# Patient Record
Sex: FEMALE | Race: WHITE | NOT HISPANIC OR LATINO | URBAN - METROPOLITAN AREA
[De-identification: names, ages, dates, MRNs, and addresses within clinical notes are randomized per-mention and may not be internally consistent; named-entity substitution may affect disease eponyms.]

---

## 2020-01-07 ENCOUNTER — EMERGENCY (EMERGENCY)
Facility: HOSPITAL | Age: 30
LOS: 1 days | Discharge: HOME | End: 2020-01-07
Attending: EMERGENCY MEDICINE | Admitting: EMERGENCY MEDICINE
Payer: COMMERCIAL

## 2020-01-07 VITALS
DIASTOLIC BLOOD PRESSURE: 62 MMHG | OXYGEN SATURATION: 100 % | SYSTOLIC BLOOD PRESSURE: 96 MMHG | TEMPERATURE: 99 F | HEART RATE: 70 BPM | RESPIRATION RATE: 18 BRPM

## 2020-01-07 PROCEDURE — 99282 EMERGENCY DEPT VISIT SF MDM: CPT

## 2020-01-07 NOTE — ED PROVIDER NOTE - NSFOLLOWUPINSTRUCTIONS_ED_ALL_ED_FT
Please take ibuprofen 400mg every 6 hours as needed for pain with food.  Please take tylenol 650mg every 4 hours as needed for pain.  Please follow up with your primary doctor in the next few days.    Cervical Sprain (Neck Sprain)    A cervical sprain is when the connective tissues (muscles/ligaments) that hold the neck bones in place stretch or tear. You may apply heating or cooling pads (or ice) to help with the pain. Avoid positions and activities that make your problems worse.    SEEK IMMEDIATE MEDICAL CARE IF YOU HAVE ANY OF THE FOLLOWING SYMPTOMS: weakness, or numbness of any part of the body, headache, dizziness or lightheadedness, or loss of control of bowel/bladder
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
## 2020-01-07 NOTE — ED PROVIDER NOTE - NS ED ROS FT
ROS:  GENERAL: No fever, no chills  EYES: no change in vision  HEENT: no trouble swallowing, no trouble speaking  CARDIAC: no chest pain  PULMONARY: no cough, no shortness of breath  GI: no abdominal pain, no vomiting, no diarrhea, no constipation  : No dysuria, no frequency, no change in appearance, or odor of urine  SKIN: no rashes  NEURO: no headache, no weakness, no numbness  MSK: +neck/back pain
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
## 2020-01-07 NOTE — ED PROVIDER NOTE - PATIENT PORTAL LINK FT
You can access the FollowMyHealth Patient Portal offered by [facility] by registering at the following website: [email or web address]. By joining [facility]’s FollowMyHealth portal, you will also be able to view your health information using other applications (apps) compatible with our system.
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
## 2020-05-13 ENCOUNTER — INPATIENT (INPATIENT)
Facility: HOSPITAL | Age: 30
LOS: 1 days | Discharge: HOME | End: 2020-05-15
Attending: INTERNAL MEDICINE | Admitting: INTERNAL MEDICINE
Payer: COMMERCIAL

## 2020-05-13 VITALS
SYSTOLIC BLOOD PRESSURE: 109 MMHG | RESPIRATION RATE: 17 BRPM | HEART RATE: 93 BPM | DIASTOLIC BLOOD PRESSURE: 92 MMHG | OXYGEN SATURATION: 100 % | TEMPERATURE: 99 F

## 2020-05-13 LAB
ALBUMIN SERPL ELPH-MCNC: 5.2 G/DL — HIGH (ref 3.3–5)
ALP SERPL-CCNC: 41 U/L — SIGNIFICANT CHANGE UP (ref 40–120)
ALT FLD-CCNC: 13 U/L — SIGNIFICANT CHANGE UP (ref 4–33)
ANION GAP SERPL CALC-SCNC: 16 MMO/L — HIGH (ref 7–14)
APTT BLD: 27.6 SEC — SIGNIFICANT CHANGE UP (ref 27.5–36.3)
AST SERPL-CCNC: 19 U/L — SIGNIFICANT CHANGE UP (ref 4–32)
BASOPHILS # BLD AUTO: 0.02 K/UL — SIGNIFICANT CHANGE UP (ref 0–0.2)
BASOPHILS NFR BLD AUTO: 0.2 % — SIGNIFICANT CHANGE UP (ref 0–2)
BILIRUB SERPL-MCNC: 0.6 MG/DL — SIGNIFICANT CHANGE UP (ref 0.2–1.2)
BLD GP AB SCN SERPL QL: NEGATIVE — SIGNIFICANT CHANGE UP
BUN SERPL-MCNC: 5 MG/DL — LOW (ref 7–23)
CALCIUM SERPL-MCNC: 10 MG/DL — SIGNIFICANT CHANGE UP (ref 8.4–10.5)
CHLORIDE SERPL-SCNC: 102 MMOL/L — SIGNIFICANT CHANGE UP (ref 98–107)
CO2 SERPL-SCNC: 23 MMOL/L — SIGNIFICANT CHANGE UP (ref 22–31)
CREAT SERPL-MCNC: 0.73 MG/DL — SIGNIFICANT CHANGE UP (ref 0.5–1.3)
EOSINOPHIL # BLD AUTO: 0.1 K/UL — SIGNIFICANT CHANGE UP (ref 0–0.5)
EOSINOPHIL NFR BLD AUTO: 1.1 % — SIGNIFICANT CHANGE UP (ref 0–6)
GLUCOSE SERPL-MCNC: 103 MG/DL — HIGH (ref 70–99)
HCG SERPL-ACNC: < 5 MIU/ML — SIGNIFICANT CHANGE UP
HCT VFR BLD CALC: 41.2 % — SIGNIFICANT CHANGE UP (ref 34.5–45)
HGB BLD-MCNC: 14.1 G/DL — SIGNIFICANT CHANGE UP (ref 11.5–15.5)
IMM GRANULOCYTES NFR BLD AUTO: 0.1 % — SIGNIFICANT CHANGE UP (ref 0–1.5)
INR BLD: 1.09 — SIGNIFICANT CHANGE UP (ref 0.88–1.17)
LIDOCAIN IGE QN: 22.8 U/L — SIGNIFICANT CHANGE UP (ref 7–60)
LYMPHOCYTES # BLD AUTO: 3.63 K/UL — HIGH (ref 1–3.3)
LYMPHOCYTES # BLD AUTO: 41.4 % — SIGNIFICANT CHANGE UP (ref 13–44)
MCHC RBC-ENTMCNC: 31.1 PG — SIGNIFICANT CHANGE UP (ref 27–34)
MCHC RBC-ENTMCNC: 34.2 % — SIGNIFICANT CHANGE UP (ref 32–36)
MCV RBC AUTO: 90.7 FL — SIGNIFICANT CHANGE UP (ref 80–100)
MONOCYTES # BLD AUTO: 0.59 K/UL — SIGNIFICANT CHANGE UP (ref 0–0.9)
MONOCYTES NFR BLD AUTO: 6.7 % — SIGNIFICANT CHANGE UP (ref 2–14)
NEUTROPHILS # BLD AUTO: 4.41 K/UL — SIGNIFICANT CHANGE UP (ref 1.8–7.4)
NEUTROPHILS NFR BLD AUTO: 50.5 % — SIGNIFICANT CHANGE UP (ref 43–77)
NRBC # FLD: 0 K/UL — SIGNIFICANT CHANGE UP (ref 0–0)
NT-PROBNP SERPL-SCNC: 37.25 PG/ML — SIGNIFICANT CHANGE UP
PLATELET # BLD AUTO: 313 K/UL — SIGNIFICANT CHANGE UP (ref 150–400)
PMV BLD: 10.7 FL — SIGNIFICANT CHANGE UP (ref 7–13)
POTASSIUM SERPL-MCNC: 3.5 MMOL/L — SIGNIFICANT CHANGE UP (ref 3.5–5.3)
POTASSIUM SERPL-SCNC: 3.5 MMOL/L — SIGNIFICANT CHANGE UP (ref 3.5–5.3)
PROT SERPL-MCNC: 8 G/DL — SIGNIFICANT CHANGE UP (ref 6–8.3)
PROTHROM AB SERPL-ACNC: 12.6 SEC — SIGNIFICANT CHANGE UP (ref 9.8–13.1)
RBC # BLD: 4.54 M/UL — SIGNIFICANT CHANGE UP (ref 3.8–5.2)
RBC # FLD: 11.7 % — SIGNIFICANT CHANGE UP (ref 10.3–14.5)
RH IG SCN BLD-IMP: POSITIVE — SIGNIFICANT CHANGE UP
SARS-COV-2 RNA SPEC QL NAA+PROBE: SIGNIFICANT CHANGE UP
SODIUM SERPL-SCNC: 141 MMOL/L — SIGNIFICANT CHANGE UP (ref 135–145)
TROPONIN T, HIGH SENSITIVITY: < 6 NG/L — SIGNIFICANT CHANGE UP (ref ?–14)
WBC # BLD: 8.76 K/UL — SIGNIFICANT CHANGE UP (ref 3.8–10.5)
WBC # FLD AUTO: 8.76 K/UL — SIGNIFICANT CHANGE UP (ref 3.8–10.5)

## 2020-05-13 PROCEDURE — 71045 X-RAY EXAM CHEST 1 VIEW: CPT | Mod: 26

## 2020-05-13 NOTE — ED ADULT TRIAGE NOTE - CHIEF COMPLAINT QUOTE
Patient c/o redness to chest, shortness of breath starting Saturday. Patient reports she was diagnosed with pericarditis last week. Patient states she last took 600mg ibuprofen around 7pm with minimal relief. Denies any other past medical history.

## 2020-05-13 NOTE — ED PROVIDER NOTE - PHYSICAL EXAMINATION
General: anxious-appearing young woman in no acute distress  Head: normocephalic, atraumatic  Eyes: clear eyes  Mouth: moist mucous membranes  Neck: supple neck  CV: normal rate and rhythm, normal s1, s2, no LE edema or tenderness to palpation, peripheral pulses 2+ bilaterally  Respiratory: clear to auscultation bilaterally  Abdomen: soft, nondistended, mild tenderness in epigastric region  : no suprapubic tenderness  Neuro: alert and oriented x3, speech clear, strength 5/5 UE and LE bilaterally  Skin: no rash on chest  Extremities: no swelling noted of either knee, no warmth or tenderness to palpation, no overlying erythema

## 2020-05-13 NOTE — ED CDU PROVIDER INITIAL DAY NOTE - DETAILS
Patient is a 29 y.o female with no known PMHx who presents to ED c/o 1.5 weeks of intermittent chest pain with sob. Previously seen at [facility] and was dx with pericarditis. Pt seen and worked up in ED, ECG showing ST Elevations I, II, III, AVF V4-V6 with TWI V1V2, possible early repol vs pericarditis. Negative trop/bnp. CXR neg. Covid neg. Cards consulted. Recommend Echo in am. Pt transferred to CDU for; echo, pain control, tele, and cards.

## 2020-05-13 NOTE — ED CDU PROVIDER INITIAL DAY NOTE - ATTENDING CONTRIBUTION TO CARE
Karen: 28yo female with no significant medical history c/o one week of subjective fevers and chills with some chest pain and SOB. Pt seen at an C last week told that she had pericarditis and was todl to take ibuprofen. However since then the chest pain and SOB has continued to worsen prompting her visit to the ED today. No associated LE pain or edema. No abdominal pain, nausea or vomiting. Exam: GENERAL: well appearing, NAD, HEENT: MMM, PERRLA, CARDIO: +S1/S2, no murmurs, rubs or gallops, LUNGS: CTA B/L, no wheezing, rales or rhonchi, ABD: soft, nontender, BSx4 quadrants, no guarding or rigidity. EXT: No LE edema or calf TTP, 2+ distal pulses x 4 extremities. NEURO: AxOx3,SKIN: no rashes or lesions, well perfused A/P- 28yo female with likely COVID and pericarditis. discussed with tele doc on call plan for CDU, echo, tele monitoring and cards evaluation in the morning

## 2020-05-13 NOTE — ED PROVIDER NOTE - ATTENDING CONTRIBUTION TO CARE
Karen: 28yo female with no significant medical history c/o one week of subjective fevers and chills with some chest pain and SOB. Pt seen at an [facility] last week told that she had pericarditis and was todl to take ibuprofen. However since then the chest pain and SOB has continued to worsen prompting her visit to the ED today. No associated LE pain or edema. No abdominal pain, nausea or vomiting. Exam: GENERAL: well appearing, NAD, HEENT: MMM, PERRLA, CARDIO: +S1/S2, no murmurs, rubs or gallops, LUNGS: CTA B/L, no wheezing, rales or rhonchi, ABD: soft, nontender, BSx4 quadrants, no guarding or rigidity. EXT: No LE edema or calf TTP, 2+ distal pulses x 4 extremities. NEURO: AxOx3,SKIN: no rashes or lesions, well perfused A/P- 28yo female with likely COVID with STEMI on EKG, STEMI called and discussed with interventionalist, no indication for cath at this time likely pericarditis. will obtain labs, CXR and reassess.

## 2020-05-13 NOTE — ED ADULT NURSE NOTE - OBJECTIVE STATEMENT
Patient is a 29y female, A&Ox4, ambulatory, no significant medical history, presenting to the emergency department for subjective fevers and chest pain. Patient was seen at an urgent care last week and was diagnosed with pericarditis. The pain is intermittent, midsternal pressure, radiating around left breast to the back. Today the patient felt anxious and warm along with the chest pain and came to the emergency department for eval. IV initiated 18 gauge right antecubital. Labs drawn and sent. Placed on cardiac monitor, sinus rhythm. As per MD, EKG has abnormality. Patient also placed on zoll. Stretcher in lowest position, wheels locked, side rails up as per protocol. Call bell in reach. Will continue to monitor.

## 2020-05-13 NOTE — ED CDU PROVIDER INITIAL DAY NOTE - OBJECTIVE STATEMENT
HPI: Patient is a 29 y.o female with no known PMHx who presents to ED c/o 1.5 weeks of intermittent chest pain with sob. Patient states that about a month ago began to have b/l knee pain and swelling, then notes 1.5 week of intermittent chest pain mostly left sided that is non-exertional, mildly pleuritic and exacerbated with touch. Sx a/w some sob. Admits she has hx of anxiety and was experiencing more frequent panic attacks as well. Pt notes some viral URI sx as well with subjective fevers, nasal congestion and post-nasal drip. [place] [facility] [place] was dx with pericarditis, advised to take IBU. Symptoms persisted so patient came to ED. Pt seen and worked up in ED, ECG showing ST Elevations I, II, III, AVF V4-V6 with TWI V1V2, possible early repol vs pericarditis.  Pt was code STEMI, ED team spoke to interventionalist at this time who reviewed ECG and feels sx likely pericarditis. Negative trop/bnp. CXR neg. Covid neg. Cards consulted. Recommend Echo in am. Pt transferred to CDU for; echo, pain control, tele, and cards. Pt denies OCP use, v/d/abdominal pain, neck pain, trauma/falls, calf pain. Pt +smoker.

## 2020-05-13 NOTE — ED PROVIDER NOTE - NS ED ROS FT
General: no fever  Head: no headache  Eyes: no eye pain   ENT: no nasal discharge/congestion, no sore throat  CV: +chest pain  Resp: +SOB, no cough  GI: +nausea, no V/D  : no dysuria  MSK: no new joint pain  Skin: no rash  Neuro: no focal weakness

## 2020-05-13 NOTE — ED PROVIDER NOTE - OBJECTIVE STATEMENT
28yo woman no PMH presents with SOB x 1 week with associated left upper back pain radiating to the front and nausea. She was seen at urgent care 2 days ago and was diagnosed with pericarditis and told to take ibuprofen which had initially helped but then symptoms worsened again and she came to ED for evaluation. She states that she has had decreased PO intake with nausea in the past week but was able to tolerate PO. She denies fever, cough, sore throat, runny nose, vomiting or diarrhea. She states she noticed a redness on her left chest when symptoms started but denies any current rash at this time. She also notes she has generalized weakness but no focal weakness in arms or legs. She also notes that she has swelling in bilateral knees but no overlying skin changes. No trauma, no strenuous activity. She has an uncle with heart disease at <50 years old. Family hx of autoimmune disorders.

## 2020-05-13 NOTE — ED CDU PROVIDER INITIAL DAY NOTE - PHYSICAL EXAMINATION
Vital signs reviewed.   CONSTITUTIONAL: Well-appearing; well-nourished; in no apparent distress. Non-toxic appearing.   HEAD: Normocephalic, atraumatic.  EYES: PERRL, EOM intact, conjunctiva and sclera WNL.  ENT: normal nose; no rhinorrhea;   NECK/LYMPH: Supple; non-tender;   CARD: Normal S1, S2; no murmurs, rubs, or gallops noted.  RESP: Normal chest excursion with respiration; breath sounds clear and equal bilaterally; no wheezes, rhonchi, or rales.  ABD/GI: soft, non-distended;   EXT/MS: moves all extremities; distal pulses are normal, no pedal edema.  SKIN: Normal for age and race; warm; dry; good turgor; no apparent lesions or exudate noted.  NEURO: Awake, alert, oriented x 3, no gross deficits  PSYCH: Normal mood; appropriate affect.
Attending Only

## 2020-05-14 DIAGNOSIS — U07.0 VAPING-RELATED DISORDER: ICD-10-CM

## 2020-05-14 DIAGNOSIS — Z29.9 ENCOUNTER FOR PROPHYLACTIC MEASURES, UNSPECIFIED: ICD-10-CM

## 2020-05-14 DIAGNOSIS — I30.9 ACUTE PERICARDITIS, UNSPECIFIED: ICD-10-CM

## 2020-05-14 DIAGNOSIS — F12.10 CANNABIS ABUSE, UNCOMPLICATED: ICD-10-CM

## 2020-05-14 DIAGNOSIS — I31.9 DISEASE OF PERICARDIUM, UNSPECIFIED: ICD-10-CM

## 2020-05-14 LAB
B PERT DNA SPEC QL NAA+PROBE: NOT DETECTED — SIGNIFICANT CHANGE UP
C PNEUM DNA SPEC QL NAA+PROBE: NOT DETECTED — SIGNIFICANT CHANGE UP
CRP SERPL-MCNC: < 4 MG/L — SIGNIFICANT CHANGE UP
D DIMER BLD IA.RAPID-MCNC: < 150 NG/ML — SIGNIFICANT CHANGE UP
ERYTHROCYTE [SEDIMENTATION RATE] IN BLOOD: 4 MM/HR — SIGNIFICANT CHANGE UP (ref 4–25)
FLUAV H1 2009 PAND RNA SPEC QL NAA+PROBE: NOT DETECTED — SIGNIFICANT CHANGE UP
FLUAV H1 RNA SPEC QL NAA+PROBE: NOT DETECTED — SIGNIFICANT CHANGE UP
FLUAV H3 RNA SPEC QL NAA+PROBE: NOT DETECTED — SIGNIFICANT CHANGE UP
FLUAV SUBTYP SPEC NAA+PROBE: NOT DETECTED — SIGNIFICANT CHANGE UP
FLUBV RNA SPEC QL NAA+PROBE: NOT DETECTED — SIGNIFICANT CHANGE UP
HADV DNA SPEC QL NAA+PROBE: NOT DETECTED — SIGNIFICANT CHANGE UP
HCOV PNL SPEC NAA+PROBE: SIGNIFICANT CHANGE UP
HMPV RNA SPEC QL NAA+PROBE: NOT DETECTED — SIGNIFICANT CHANGE UP
HPIV1 RNA SPEC QL NAA+PROBE: NOT DETECTED — SIGNIFICANT CHANGE UP
HPIV2 RNA SPEC QL NAA+PROBE: NOT DETECTED — SIGNIFICANT CHANGE UP
HPIV3 RNA SPEC QL NAA+PROBE: NOT DETECTED — SIGNIFICANT CHANGE UP
HPIV4 RNA SPEC QL NAA+PROBE: NOT DETECTED — SIGNIFICANT CHANGE UP
RSV RNA SPEC QL NAA+PROBE: NOT DETECTED — SIGNIFICANT CHANGE UP
RV+EV RNA SPEC QL NAA+PROBE: NOT DETECTED — SIGNIFICANT CHANGE UP
TROPONIN T, HIGH SENSITIVITY: < 6 NG/L — SIGNIFICANT CHANGE UP (ref ?–14)

## 2020-05-14 PROCEDURE — 93306 TTE W/DOPPLER COMPLETE: CPT | Mod: 26

## 2020-05-14 RX ORDER — SODIUM CHLORIDE 9 MG/ML
1000 INJECTION INTRAMUSCULAR; INTRAVENOUS; SUBCUTANEOUS ONCE
Refills: 0 | Status: COMPLETED | OUTPATIENT
Start: 2020-05-14 | End: 2020-05-14

## 2020-05-14 RX ORDER — PANTOPRAZOLE SODIUM 20 MG/1
40 TABLET, DELAYED RELEASE ORAL
Refills: 0 | Status: DISCONTINUED | OUTPATIENT
Start: 2020-05-14 | End: 2020-05-15

## 2020-05-14 RX ORDER — SODIUM CHLORIDE 9 MG/ML
1000 INJECTION INTRAMUSCULAR; INTRAVENOUS; SUBCUTANEOUS
Refills: 0 | Status: DISCONTINUED | OUTPATIENT
Start: 2020-05-14 | End: 2020-05-15

## 2020-05-14 RX ORDER — COLCHICINE 0.6 MG
0.6 TABLET ORAL ONCE
Refills: 0 | Status: COMPLETED | OUTPATIENT
Start: 2020-05-14 | End: 2020-05-14

## 2020-05-14 RX ORDER — KETOROLAC TROMETHAMINE 30 MG/ML
30 SYRINGE (ML) INJECTION ONCE
Refills: 0 | Status: DISCONTINUED | OUTPATIENT
Start: 2020-05-14 | End: 2020-05-14

## 2020-05-14 RX ORDER — ONDANSETRON 8 MG/1
4 TABLET, FILM COATED ORAL ONCE
Refills: 0 | Status: COMPLETED | OUTPATIENT
Start: 2020-05-14 | End: 2020-05-14

## 2020-05-14 RX ORDER — COLCHICINE 0.6 MG
0.6 TABLET ORAL
Refills: 0 | Status: DISCONTINUED | OUTPATIENT
Start: 2020-05-14 | End: 2020-05-15

## 2020-05-14 RX ORDER — IBUPROFEN 200 MG
600 TABLET ORAL EVERY 6 HOURS
Refills: 0 | Status: DISCONTINUED | OUTPATIENT
Start: 2020-05-14 | End: 2020-05-15

## 2020-05-14 RX ADMIN — Medication 30 MILLIGRAM(S): at 06:53

## 2020-05-14 RX ADMIN — SODIUM CHLORIDE 1000 MILLILITER(S): 9 INJECTION INTRAMUSCULAR; INTRAVENOUS; SUBCUTANEOUS at 00:52

## 2020-05-14 RX ADMIN — SODIUM CHLORIDE 100 MILLILITER(S): 9 INJECTION INTRAMUSCULAR; INTRAVENOUS; SUBCUTANEOUS at 18:12

## 2020-05-14 RX ADMIN — Medication 600 MILLIGRAM(S): at 20:38

## 2020-05-14 RX ADMIN — ONDANSETRON 4 MILLIGRAM(S): 8 TABLET, FILM COATED ORAL at 20:20

## 2020-05-14 RX ADMIN — Medication 30 MILLIGRAM(S): at 00:23

## 2020-05-14 RX ADMIN — SODIUM CHLORIDE 100 MILLILITER(S): 9 INJECTION INTRAMUSCULAR; INTRAVENOUS; SUBCUTANEOUS at 16:18

## 2020-05-14 RX ADMIN — SODIUM CHLORIDE 100 MILLILITER(S): 9 INJECTION INTRAMUSCULAR; INTRAVENOUS; SUBCUTANEOUS at 06:52

## 2020-05-14 RX ADMIN — Medication 0.6 MILLIGRAM(S): at 16:18

## 2020-05-14 NOTE — H&P ADULT - ASSESSMENT
29y Female with NO PMH, ?recent viral illness, COVID negative x 1, presenting with abnormal EKG and chest pain, probably secondary to pericarditis.

## 2020-05-14 NOTE — H&P ADULT - RS GEN PE MLT RESP DETAILS PC
airway patent/breath sounds equal/respirations non-labored/no rhonchi/no wheezes/no chest wall tenderness/good air movement/clear to auscultation bilaterally/no rales

## 2020-05-14 NOTE — H&P ADULT - ATTENDING COMMENTS
Patient seen and examined.  Agree with above.   Admitted with chest pain anterior wall ECG changes  MI ruled out  Suspect symptoms are secondary to pericarditis  Pt. still with symptoms but did improve after NSAIDS  Will continue with nsaids and colchicine  No urgent cath needed at this time  Further workup pending clinical course    Bree Carranza MD

## 2020-05-14 NOTE — H&P ADULT - PROBLEM SELECTOR PLAN 1
tele monitor   cardiac enzymes x 2: neg  Serial EKGs  started -Ibuprofen 600mg Q8h, Colchicine 0.6mg QDaily, Protonix 40mg daily for GI prophylaxis  continue

## 2020-05-14 NOTE — H&P ADULT - NEGATIVE CARDIOVASCULAR SYMPTOMS
no palpitations/no dyspnea on exertion/no orthopnea/no claudication/no paroxysmal nocturnal dyspnea/no peripheral edema

## 2020-05-14 NOTE — H&P ADULT - NSHPPOAURINARYCATHETER_GEN_ALL_CORE
TRANSFER - OUT REPORT:    Verbal report given to JESENIA Alvarado(name) on Kiesha Reardon  being transferred to 704(unit) for routine progression of care       Report consisted of patients Situation, Background, Assessment and   Recommendations(SBAR). Information from the following report(s) SBAR and ED Summary was reviewed with the receiving nurse. Lines:   Peripheral IV 05/04/20 Left Antecubital (Active)        Opportunity for questions and clarification was provided.
no

## 2020-05-14 NOTE — H&P ADULT - HISTORY OF PRESENT ILLNESS
29 year old female with no PMH presents to ER with SOB and chest discomfort x 1 week.  Chest pain left sided, described as soreness and ache, 6/10, radiating to the midback, pleuritic in nature. Chest pain worse leaning forward and improves with lying down. Pt [facility] to [facility] where she was diagnosed with acute pericarditis and was sent home on ibuprofen. However, pt had a poor appetite and didn't take ibuprofen until 2 days ago. Pt denies fever, chills, cough, congestion, rhinorrhea, palpitations, nausea, vomiting or abdominal pain. [facility]

## 2020-05-14 NOTE — H&P ADULT - NSHPLABSRESULTS_GEN_ALL_CORE
EKG: SR with sinus arrhythmia @ 62 bpm TWI V1-V3  Trops: <6, CRP and ESR neg  RVP: neg  COVID PCR: neg

## 2020-05-14 NOTE — CONSULT NOTE ADULT - SUBJECTIVE AND OBJECTIVE BOX
HISTORY OF PRESENT ILLNESS: HPI:    29 year old female with no PMH presents to ER with SOB and chest discomfort x 1 week.  She was diagnosed with pericarditis at an urgent care center a few days prior.  She has been unable to take ibuprofen due to painful swallowing and poor appetite.  She c/o ongoing dull chest pain that is exacerbated by leaning forward and bending over.  She denies exertional chest pain or prior cardiac history.  She denies Fever/chills/viral illness, but reports loss of appetite x 10 days with unintentional weight loss.  She has some relief of her chest pain with ibuprofen in the ER.      PAST MEDICAL & SURGICAL HISTORY:  Anxiety  No significant past surgical history    MEDICATIONS  (STANDING):  sodium chloride 0.9%. 1000 milliLiter(s) (100 mL/Hr) IV Continuous <Continuous>      Allergies  No Known Drug Allergies  seasonal (Rhinorrhea; Eye Irritation)      FAMILY HISTORY:  Family history of diabetes mellitus type II  Family history of stroke  Family history of coronary arteriosclerosis  Noncontributory for sudden cardiac death    SOCIAL HISTORY:    [x ] Non-smoker  [ ] Smoker  [ ] Alcohol    FLU VACCINE THIS YEAR STARTS IN AUGUST:  [ ] Yes    [ ] No    IF OVER 65 HAVE YOU EVER HAD A PNA VACCINE:  [ ] Yes    [ ] No       [ ] N/A      REVIEW OF SYSTEMS:  [x]chest pain  [  ]shortness of breath  [  ]palpitations  [  ]syncope  [ ]near syncope [ ]upper extremity weakness   [ ] lower extremity weakness  [  ]diplopia  [  ]altered mental status   [  ]fevers  [ ]chills [ ]nausea  [ ]vomitting  [  ]dysphagia    [ ]abdominal pain  [ ]melena  [ ]BRBPR    [  ]epistaxis  [  ]rash    [ ]lower extremity edema      [ x] All others negative	  [ ] Unable to obtain      LABS:	 	                        14.1   8.76  )-----------( 313      ( 13 May 2020 21:35 )             41.2     141  |  102  |  5<L>  ----------------------------<  103<H>  3.5   |  23  |  0.73    Ca    10.0      13 May 2020 21:35    TPro  8.0  /  Alb  5.2<H>  /  TBili  0.6  /  DBili  x   /  AST  19  /  ALT  13  /  AlkPhos  41  05-13    Creatinine Trend: 0.73<--    Coags:  PT/INR - ( 13 May 2020 21:35 )   PT: 12.6 SEC;   INR: 1.09     PTT - ( 13 May 2020 21:35 )  PTT:27.6 SEC    proBNP: Serum Pro-Brain Natriuretic Peptide: 37.25 pg/mL (05-13 @ 21:35)      PHYSICAL EXAM:  T(C): 36.9 (05-14-20 @ 11:02), Max: 37.3 (05-13-20 @ 20:49)  HR: 70 (05-14-20 @ 11:02) (63 - 93)  BP: 93/57 (05-14-20 @ 11:02) (93/57 - 111/65)  RR: 14 (05-14-20 @ 11:02) (14 - 18)  SpO2: 100% (05-14-20 @ 11:02) (100% - 100%)    Gen: Appears well in NAD  HEENT:  (-)icterus (-)pallor  CV: N S1 S2 1/6 KELLY (+)2 Pulses B/l  Resp:  Clear to ausculatation B/L, normal effort  GI: (+) BS Soft, NT, ND  Lymph:  (-)Edema, (-)obvious lymphadenopathy  Skin: Warm to touch, Normal turgor  Psych: Appropriate mood and affect      ECG:  	SR, non specific ST-T changes    RADIOLOGY:         CXR: < from: Xray Chest 1 View- PORTABLE-Urgent (05.13.20 @ 21:58) >  Impression:  1.  Clear lungs    < end of copied text >    < from: Transthoracic Echocardiogram (05.14.20 @ 11:12) >  Pericardium/PleuraNormal pericardium with no pericardial  effusion.  ------------------------------------------------------------------------  CONCLUSIONS:  1. Normal mitral valve. Minimal mitral regurgitation.  2. Normal left ventricular internal dimensions and wall  thicknesses.  3. Normal left ventricular systolic function. No segmental  wall motion abnormalities.  4. Normal left ventricular diastolic function.  5. Normal right ventricular size and function.  ------------------------------------------------------------------------  Confirmed on  5/14/2020 - 13:58:58 by Bryn Beltrán M.D.    < end of copied text >      ASSESSMENT/PLAN: 	29y Female with NO PMH, ?recent viral illness, COVID negative x 1, presenting with abnormal EKG and chest pain, probably secondary to pericarditis    --symptoms most consistent with pericarditis  --inflammatory markers are WNL/ echo noted  --start Colchicine   --given abnormal EKG, monitor overnight on tele  --likely plan for coronary CT

## 2020-05-14 NOTE — ED CDU PROVIDER SUBSEQUENT DAY NOTE - PHYSICAL EXAMINATION
Vital signs reviewed.   CONSTITUTIONAL: Well-appearing; well-nourished; in no apparent distress. Non-toxic appearing.   HEAD: Normocephalic, atraumatic.  EYES: PERRL, EOM intact, conjunctiva and sclera WNL.  ENT: normal nose; no rhinorrhea;   NECK/LYMPH: Supple; non-tender;   CARD: Normal S1, S2; no murmurs, rubs, or gallops noted.  RESP: Normal chest excursion with respiration; breath sounds clear and equal bilaterally; no wheezes, rhonchi, or rales.  ABD/GI: soft, non-distended;   EXT/MS: moves all extremities; distal pulses are normal, no pedal edema.  SKIN: Normal for age and race; warm; dry; good turgor; no apparent lesions or exudate noted.  NEURO: Awake, alert, oriented x 3, no gross deficits  PSYCH: Normal mood; appropriate affect.

## 2020-05-14 NOTE — ED CDU PROVIDER SUBSEQUENT DAY NOTE - HISTORY
Patient is a 29 y.o female with no known PMHx who presents to ED c/o 1.5 weeks of intermittent chest pain with sob. Patient states that about a month ago began to have b/l knee pain and swelling, then notes 1.5 week of intermittent chest pain mostly left sided that is non-exertional, mildly pleuritic and exacerbated with touch. Sx a/w some sob. Admits she has hx of anxiety and was experiencing more frequent panic attacks as well. Pt notes some viral URI sx as well with subjective fevers, nasal congestion and post-nasal drip. Pt Previously seen at [facility] [place] over the weekend and was dx with pericarditis, advised to take IBU. Symptoms persisted so patient came to ED. Pt seen and worked up in ED, ECG showing ST Elevations I, II, III, AVF V4-V6 with TWI V1V2, possible early repol vs pericarditis.  Pt was code STEMI, ED team spoke to interventionalist at this time who reviewed ECG and feels sx likely pericarditis. Negative trop/bnp. CXR neg. Covid neg. Cards consulted. Recommend Echo in am. Pt transferred to CDU for; echo, pain control, tele, and cards. Pt denies OCP use, v/d/abdominal pain, neck pain, trauma/falls, calf pain. Pt +smoker.    In interim- Patient resting comfortably. No complaints. No acute events on tele. D-dimer negative. Pt pending Echo in AM and cards consult. Will monitor closely.

## 2020-05-14 NOTE — H&P ADULT - NSICDXFAMILYHX_GEN_ALL_CORE_FT
FAMILY HISTORY:  Family history of coronary arteriosclerosis  Family history of diabetes mellitus type II  Family history of stroke

## 2020-05-14 NOTE — H&P ADULT - NSHPSOCIALHISTORY_GEN_ALL_CORE
Pt single, lives with mother and brother. Former smoker 1ppd x10 years, quit 1  year ago. Pt admits to vaping marijuana at least 3 times/day.   LMP: 2 weeks ago 4/30/20.

## 2020-05-15 ENCOUNTER — TRANSCRIPTION ENCOUNTER (OUTPATIENT)
Age: 30
End: 2020-05-15

## 2020-05-15 VITALS
DIASTOLIC BLOOD PRESSURE: 55 MMHG | HEART RATE: 66 BPM | SYSTOLIC BLOOD PRESSURE: 90 MMHG | OXYGEN SATURATION: 100 % | TEMPERATURE: 99 F | RESPIRATION RATE: 17 BRPM

## 2020-05-15 DIAGNOSIS — F41.9 ANXIETY DISORDER, UNSPECIFIED: ICD-10-CM

## 2020-05-15 DIAGNOSIS — U07.1 COVID-19: ICD-10-CM

## 2020-05-15 LAB
ANION GAP SERPL CALC-SCNC: 10 MMO/L — SIGNIFICANT CHANGE UP (ref 7–14)
BUN SERPL-MCNC: 5 MG/DL — LOW (ref 7–23)
C3 SERPL-MCNC: 69.1 MG/DL — LOW (ref 90–180)
C4 SERPL-MCNC: 14.9 MG/DL — SIGNIFICANT CHANGE UP (ref 10–40)
CALCIUM SERPL-MCNC: 9.4 MG/DL — SIGNIFICANT CHANGE UP (ref 8.4–10.5)
CHLORIDE SERPL-SCNC: 103 MMOL/L — SIGNIFICANT CHANGE UP (ref 98–107)
CHOLEST SERPL-MCNC: 112 MG/DL — LOW (ref 120–199)
CO2 SERPL-SCNC: 25 MMOL/L — SIGNIFICANT CHANGE UP (ref 22–31)
CREAT SERPL-MCNC: 0.61 MG/DL — SIGNIFICANT CHANGE UP (ref 0.5–1.3)
GLUCOSE BLDC GLUCOMTR-MCNC: 70 MG/DL — SIGNIFICANT CHANGE UP (ref 70–99)
GLUCOSE SERPL-MCNC: 89 MG/DL — SIGNIFICANT CHANGE UP (ref 70–99)
HCT VFR BLD CALC: 38.1 % — SIGNIFICANT CHANGE UP (ref 34.5–45)
HDLC SERPL-MCNC: 49 MG/DL — SIGNIFICANT CHANGE UP (ref 45–65)
HGB BLD-MCNC: 12.7 G/DL — SIGNIFICANT CHANGE UP (ref 11.5–15.5)
LIPID PNL WITH DIRECT LDL SERPL: 63 MG/DL — SIGNIFICANT CHANGE UP
MCHC RBC-ENTMCNC: 31.5 PG — SIGNIFICANT CHANGE UP (ref 27–34)
MCHC RBC-ENTMCNC: 33.3 % — SIGNIFICANT CHANGE UP (ref 32–36)
MCV RBC AUTO: 94.5 FL — SIGNIFICANT CHANGE UP (ref 80–100)
NRBC # FLD: 0 K/UL — SIGNIFICANT CHANGE UP (ref 0–0)
PLATELET # BLD AUTO: 253 K/UL — SIGNIFICANT CHANGE UP (ref 150–400)
PMV BLD: 11.1 FL — SIGNIFICANT CHANGE UP (ref 7–13)
POTASSIUM SERPL-MCNC: 4.7 MMOL/L — SIGNIFICANT CHANGE UP (ref 3.5–5.3)
POTASSIUM SERPL-SCNC: 4.7 MMOL/L — SIGNIFICANT CHANGE UP (ref 3.5–5.3)
RBC # BLD: 4.03 M/UL — SIGNIFICANT CHANGE UP (ref 3.8–5.2)
RBC # FLD: 11.8 % — SIGNIFICANT CHANGE UP (ref 10.3–14.5)
SODIUM SERPL-SCNC: 138 MMOL/L — SIGNIFICANT CHANGE UP (ref 135–145)
TRIGL SERPL-MCNC: 53 MG/DL — SIGNIFICANT CHANGE UP (ref 10–149)
WBC # BLD: 8.16 K/UL — SIGNIFICANT CHANGE UP (ref 3.8–10.5)
WBC # FLD AUTO: 8.16 K/UL — SIGNIFICANT CHANGE UP (ref 3.8–10.5)

## 2020-05-15 PROCEDURE — 93010 ELECTROCARDIOGRAM REPORT: CPT

## 2020-05-15 PROCEDURE — 75574 CT ANGIO HRT W/3D IMAGE: CPT | Mod: 26

## 2020-05-15 RX ORDER — COLCHICINE 0.6 MG
1 TABLET ORAL
Qty: 60 | Refills: 0
Start: 2020-05-15 | End: 2020-06-13

## 2020-05-15 RX ORDER — IBUPROFEN 200 MG
1 TABLET ORAL
Qty: 120 | Refills: 0
Start: 2020-05-15 | End: 2020-06-13

## 2020-05-15 RX ORDER — PANTOPRAZOLE SODIUM 20 MG/1
1 TABLET, DELAYED RELEASE ORAL
Qty: 30 | Refills: 0
Start: 2020-05-15 | End: 2020-06-13

## 2020-05-15 RX ADMIN — PANTOPRAZOLE SODIUM 40 MILLIGRAM(S): 20 TABLET, DELAYED RELEASE ORAL at 06:30

## 2020-05-15 RX ADMIN — Medication 0.6 MILLIGRAM(S): at 07:37

## 2020-05-15 RX ADMIN — Medication 600 MILLIGRAM(S): at 06:19

## 2020-05-15 RX ADMIN — Medication 600 MILLIGRAM(S): at 12:01

## 2020-05-15 NOTE — CONSULT NOTE ADULT - PROBLEM SELECTOR RECOMMENDATION 9
management per cardiology attending  unlikely to have an underlying rheum d/o  improved, ESR 6 and CRP normal  however will send

## 2020-05-15 NOTE — DISCHARGE NOTE PROVIDER - CARE PROVIDER_API CALL
Salud Avery  CARDIOVASCULAR DISEASE  [address] [facility] [address] [place] [address]  Phone: [phone]  Fax: [phone]  Follow Up Time:

## 2020-05-15 NOTE — PROGRESS NOTE ADULT - SUBJECTIVE AND OBJECTIVE BOX
Patient denies chest pain or shortness of breath.   Review of systems otherwise (-)  	    MEDICATIONS  (STANDING):  colchicine 0.6 milliGRAM(s) Oral two times a day  ibuprofen  Tablet. 600 milliGRAM(s) Oral every 6 hours  pantoprazole    Tablet 40 milliGRAM(s) Oral before breakfast  sodium chloride 0.9%. 1000 milliLiter(s) (100 mL/Hr) IV Continuous <Continuous>      LABS:	 	                          12.7   8.16  )-----------( 253      ( 15 May 2020 06:40 )             38.1     Hemoglobin: 12.7 g/dL (05-15 @ 06:40)  Hemoglobin: 14.1 g/dL (05-13 @ 21:35)    05-15    138  |  103  |  5<L>  ----------------------------<  89  4.7   |  25  |  0.61    Ca    9.4      15 May 2020 06:40    TPro  8.0  /  Alb  5.2<H>  /  TBili  0.6  /  DBili  x   /  AST  19  /  ALT  13  /  AlkPhos  41  05-13    Creatinine Trend: 0.61<--, 0.73<--  COAGS:       proBNP:   Lipid Profile:   HgA1c:   TSH:     PHYSICAL EXAM:  T(C): 36.3 (05-15-20 @ 08:10), Max: 36.9 (05-14-20 @ 19:36)  HR: 54 (05-15-20 @ 08:10) (54 - 79)  BP: 95/61 (05-15-20 @ 08:10) (91/58 - 107/63)  RR: 18 (05-15-20 @ 08:10) (16 - 18)  SpO2: 100% (05-15-20 @ 08:10) (100% - 100%)  Wt(kg): --  I&O's Summary    Height (cm): 157.48 (05-14 @ 17:50)  Weight (kg): 49.9 (05-14 @ 17:50)  BMI (kg/m2): 20.1 (05-14 @ 17:50)  BSA (m2): 1.48 (05-14 @ 17:50)    Gen: Appears well in NAD  HEENT:  (-)icterus (-)pallor  CV: N S1 S2 1/6 KELLY (+)2 Pulses B/l  Resp:  Clear to ausculatation B/L, normal effort  GI: (+) BS Soft, NT, ND  Lymph:  (-)Edema, (-)obvious lymphadenopathy  Skin: Warm to touch, Normal turgor  Psych: Appropriate mood and affect      TELEMETRY: 	SB SR      < from: Transthoracic Echocardiogram (05.14.20 @ 11:12) >  Pericardium/PleuraNormal pericardium with no pericardial  effusion.  ------------------------------------------------------------------------  CONCLUSIONS:  1. Normal mitral valve. Minimal mitral regurgitation.  2. Normal left ventricular internal dimensions and wall  thicknesses.  3. Normal left ventricular systolic function. No segmental  wall motion abnormalities.  4. Normal left ventricular diastolic function.  5. Normal right ventricular size and function.  ------------------------------------------------------------------------  Confirmed on  5/14/2020 - 13:58:58 by Bryn Beltrán M.D.    < end of copied text >      ASSESSMENT/PLAN: 	29y Female with NO PMH, ?recent viral illness, COVID negative x 1, presenting with abnormal EKG and chest pain, probably secondary to pericarditis    --symptoms most consistent with pericarditis  --inflammatory markers are WNL/ echo noted  --started Colchicine   --given abnormal EKG, plan for coronary CT  --close OP f/u with Dr Delcid next week Wed 5/20 at 930 AM Patient denies chest pain or shortness of breath.   Review of systems otherwise (-)  	    MEDICATIONS  (STANDING):  colchicine 0.6 milliGRAM(s) Oral two times a day  ibuprofen  Tablet. 600 milliGRAM(s) Oral every 6 hours  pantoprazole    Tablet 40 milliGRAM(s) Oral before breakfast  sodium chloride 0.9%. 1000 milliLiter(s) (100 mL/Hr) IV Continuous <Continuous>      LABS:	 	                          12.7   8.16  )-----------( 253      ( 15 May 2020 06:40 )             38.1     Hemoglobin: 12.7 g/dL (05-15 @ 06:40)  Hemoglobin: 14.1 g/dL (05-13 @ 21:35)    05-15    138  |  103  |  5<L>  ----------------------------<  89  4.7   |  25  |  0.61    Ca    9.4      15 May 2020 06:40    TPro  8.0  /  Alb  5.2<H>  /  TBili  0.6  /  DBili  x   /  AST  19  /  ALT  13  /  AlkPhos  41  05-13    Creatinine Trend: 0.61<--, 0.73<--  COAGS:       proBNP:   Lipid Profile:   HgA1c:   TSH:     PHYSICAL EXAM:  T(C): 36.3 (05-15-20 @ 08:10), Max: 36.9 (05-14-20 @ 19:36)  HR: 54 (05-15-20 @ 08:10) (54 - 79)  BP: 95/61 (05-15-20 @ 08:10) (91/58 - 107/63)  RR: 18 (05-15-20 @ 08:10) (16 - 18)  SpO2: 100% (05-15-20 @ 08:10) (100% - 100%)  Wt(kg): --  I&O's Summary    Height (cm): 157.48 (05-14 @ 17:50)  Weight (kg): 49.9 (05-14 @ 17:50)  BMI (kg/m2): 20.1 (05-14 @ 17:50)  BSA (m2): 1.48 (05-14 @ 17:50)    Gen: Appears well in NAD  HEENT:  (-)icterus (-)pallor  CV: N S1 S2 1/6 KELLY (+)2 Pulses B/l  Resp:  Clear to ausculatation B/L, normal effort  GI: (+) BS Soft, NT, ND  Lymph:  (-)Edema, (-)obvious lymphadenopathy  Skin: Warm to touch, Normal turgor  Psych: Appropriate mood and affect      TELEMETRY: 	SB SR      < from: Transthoracic Echocardiogram (05.14.20 @ 11:12) >  Pericardium/PleuraNormal pericardium with no pericardial  effusion.  ------------------------------------------------------------------------  CONCLUSIONS:  1. Normal mitral valve. Minimal mitral regurgitation.  2. Normal left ventricular internal dimensions and wall  thicknesses.  3. Normal left ventricular systolic function. No segmental  wall motion abnormalities.  4. Normal left ventricular diastolic function.  5. Normal right ventricular size and function.  ------------------------------------------------------------------------  Confirmed on  5/14/2020 - 13:58:58 by Byrn Beltrán M.D.    < end of copied text >      ASSESSMENT/PLAN: 	29y Female with NO PMH, ?recent viral illness, COVID negative x 1, presenting with abnormal EKG and chest pain, probably secondary to pericarditis    --symptoms most consistent with pericarditis  --inflammatory markers are WNL/ echo noted  --started Colchicine   --given abnormal EKG, plan for coronary CT  --close OP f/u with Dr Delcid next week Wed 5/20 at 930 AM        Addendum:  Coronary CT results:  < from: CT Heart with Coronaries (05.15.20 @ 15:35) >  FINDINGS:    Non-Coronary:    Heart size is normal. No pericardial effusion. Evaluation of the imaged portions of the lungs demonstrate no significantabnormality given superimposed respiratory motion artifact. The bones are unremarkable.     Coronary:  Evaluation of the coronary arteries are extremely limited due to superimposed cardiac and respiratory motion artifact.    There is no anomalous coronary artery.     The left main coronary artery is patent.     The proximal and mid segments of the left anterior descending artery are likely patent given superimposed motion artifact. The distal segment of the left anterior descending artery cannot be evaluated.    Evaluation of the left circumflex artery and its branches are limited to nondiagnostic due to superimposed motion artifact.    The proximal and distal segment soft the right coronary artery, the dominant vessel appear patent given superimposed motion artifact. Portions of the mid segment of the right coronary artery is difficult to evaluate, although it is likely patent.    Coronary Calcium Score = 0 Agatston Units    CT coronary angiography shows:  LMCA: Patent.  LAD: Proximal and mid segments are likely patent. The distal segment cannot be evaluated.  LCx: Difficult to evaluate due to motion artifact.  RCA: Dominant vessel, likely patent as detailed above.    < end of copied text >

## 2020-05-15 NOTE — DISCHARGE NOTE PROVIDER - NSDCFUADDAPPT_GEN_ALL_CORE_FT
Follow up with PCP within 1-2 weeks of discharge.   Follow up with cardiology within 1-2 weeks of discharge. You have an appointment with with Dr Delcid - Wed 5/20 at 930 AM

## 2020-05-15 NOTE — CONSULT NOTE ADULT - ASSESSMENT
29 year old female with no PMH presents to Emergency Department with shortness of breath and chest discomfort x 1 week suggestive of acute pericarditis

## 2020-05-15 NOTE — DISCHARGE NOTE PROVIDER - HOSPITAL COURSE
28 y/o female with no PMHx who presented with chest pain. COVID negative. Noted to have anteroseptal ECG changes. MI ruled out. Echo with normal LV function and no WMA. Seen by cardiology, no suspicion for ACS. No cardiac cath indicated at this time.  Inflammatory markers WNL. Started on Colchicine. Underwent coronary CT -             Close outpatient f/u with Dr Delcid next week Wed 5/20 at 930 AM 28 y/o female with no PMHx who presented with chest pain. COVID negative. Noted to have anteroseptal ECG changes. MI ruled out. Echo with normal LV function and no WMA. Seen by cardiology, no suspicion for ACS. No cardiac cath indicated at this time.  Inflammatory markers WNL. Started on Colchicine. Underwent coronary CT which was normal, no dissection.     Close outpatient f/u with Dr Delcid next week Wed 5/20 at 930 AM scheduled.         Patient seen and evaluated, no acute somatic complaints. Reviewed discharge medications with patient; All new medications requiring new prescriptions were sent to the pharmacy of patient's choice. Reviewed need for prescription for previous home medications and new prescriptions sent if requested. Medically cleared/stable for discharge as per Dr. Carranza with close outpatient follow up within 1-2 weeks of discharge. Patient understands and agrees with plan of care.

## 2020-05-15 NOTE — DISCHARGE NOTE PROVIDER - NSDCCPCAREPLAN_GEN_ALL_CORE_FT
PRINCIPAL DISCHARGE DIAGNOSIS  Diagnosis: Pericarditis  Assessment and Plan of Treatment: You presented with chest pain. Your COVID swab was negative. Your echo had normal pumping function. You were seen by cardiology and did not need a cardiac catherization. You were started on Colchicine and underwent a coronary CT which -- PRINCIPAL DISCHARGE DIAGNOSIS  Diagnosis: Pericarditis  Assessment and Plan of Treatment: You presented with chest pain. Your COVID swab was negative. Your echo had normal pumping function. You were seen by cardiology and did not need a cardiac catherization. You were started on Colchicine and underwent a coronary CT which was normal. Follow up with cardiology next week.

## 2020-05-15 NOTE — PROGRESS NOTE ADULT - SUBJECTIVE AND OBJECTIVE BOX
Subjective: pt. with episode of chest pain this morning that has since resolved; ROS otherwise negative.   	  MEDICATIONS:  MEDICATIONS  (STANDING):  colchicine 0.6 milliGRAM(s) Oral two times a day  ibuprofen  Tablet. 600 milliGRAM(s) Oral every 6 hours  pantoprazole    Tablet 40 milliGRAM(s) Oral before breakfast  sodium chloride 0.9%. 1000 milliLiter(s) (100 mL/Hr) IV Continuous <Continuous>      LABS:	 	    CARDIAC MARKERS:                                12.7   8.16  )-----------( 253      ( 15 May 2020 06:40 )             38.1     05-15    138  |  103  |  5<L>  ----------------------------<  89  4.7   |  25  |  0.61    Ca    9.4      15 May 2020 06:40    TPro  8.0  /  Alb  5.2<H>  /  TBili  0.6  /  DBili  x   /  AST  19  /  ALT  13  /  AlkPhos  41  05-13    proBNP:   Lipid Profile:   HgA1c:   TSH:       PHYSICAL EXAM:  T(C): 36.6 (05-15-20 @ 12:19), Max: 36.9 (05-14-20 @ 19:36)  HR: 68 (05-15-20 @ 12:19) (54 - 79)  BP: 91/53 (05-15-20 @ 12:19) (91/53 - 107/63)  RR: 17 (05-15-20 @ 12:19) (16 - 18)  SpO2: 100% (05-15-20 @ 12:19) (100% - 100%)  Wt(kg): --  I&O's Summary    Height (cm): 157.48 (05-14 @ 17:50)  Weight (kg): 49.9 (05-14 @ 17:50)  BMI (kg/m2): 20.1 (05-14 @ 17:50)  BSA (m2): 1.48 (05-14 @ 17:50)    Appearance: Normal	  HEENT:   Normal oral mucosa, PERRL, EOMI	  Lymphatic: No lymphadenopathy , no edema  Cardiovascular: Normal S1 S2, No JVD, No murmurs , Peripheral pulses palpable 2+ bilaterally  Respiratory: Lungs clear to auscultation, normal effort 	  Gastrointestinal:  Soft, Non-tender, + BS	  Skin: No rashes, No ecchymoses, No cyanosis, warm to touch  Musculoskeletal: Normal range of motion, normal strength  Psychiatry:  Mood & affect appropriate    TELEMETRY: SR	    ECG: SR, anterior ST changes  	  RADIOLOGY:   DIAGNOSTIC TESTING:  [ ] Echocardiogram: < from: Transthoracic Echocardiogram (05.14.20 @ 11:12) >  CONCLUSIONS:  1. Normal mitral valve. Minimal mitral regurgitation.  2. Normal left ventricular internal dimensions and wall  thicknesses.  3. Normal left ventricular systolic function. No segmental  wall motion abnormalities.  4. Normal left ventricular diastolic function.  5. Normal right ventricular size and function.    < end of copied text >    [ ]  Catheterization:  [ ] Stress Test:    OTHER: 	      ASSESSMENT/PLAN: 	29y Female with no PMHx admitted with chest pain and ECG changes.     -Pt. with anteroseptal ECG changes and chest pain  -Pt. with chest pain this morning that has since resolved - do not suspect ACS  -MI ruled out and TTE with normal LV function and no RWMA  -Do not suspect ACS at this time  -No cath indicated at this time  -Likely pericarditis - continue with treatment with NSAIDS and Colchicine per cardiology  -Follow up coronary CT  -Likely dc home if above normal  -No further interventional workup anticipated at this time     Bree Carranza MD

## 2020-05-15 NOTE — CONSULT NOTE ADULT - PROBLEM SELECTOR RECOMMENDATION 2
counseled at length re avoidance kristyn in the face of the COVID pandemic  she states that she will avoid in future

## 2020-05-15 NOTE — CONSULT NOTE ADULT - ATTENDING COMMENTS
Agree with above assessment and plan as outlined above.    - abnormal EKG with CP  - Plan for cardiac CT  - Interventional cardiology consult input appreciated    Logan Menchaca MD, [facility]  BEEPER [phone]
discussed with patient in detail, expresses understanding of treatment plans.  discussed with cardiology attending

## 2020-05-15 NOTE — CONSULT NOTE ADULT - SUBJECTIVE AND OBJECTIVE BOX
Patient is a 29y old  Female who presents with a chief complaint of chest pain    HPI:    29 year old female with no PMH presents to Emergency Department with shortness of breath and chest discomfort x 1 week.  Chest pain left sided, described as soreness and ache, 6/10, radiating to the midback, pleuritic in nature. Chest pain worse leaning forward and improves with lying down. Pt went to an Urgent Care Center where she was diagnosed with acute pericarditis and was sent home on ibuprofen. However, pt had a poor appetite and didn't take ibuprofen until 2 days ago. Pt denies fever, chills, cough, congestion, rhinorrhea, palpitations, nausea, vomiting or abdominal pain. The patient admits to vaping.     PAST MEDICAL & SURGICAL HISTORY:  Anxiety  No significant past surgical history      Review of Systems:   CONSTITUTIONAL: No fever, weight loss, or fatigue  EYES: No eye pain, visual disturbances, or discharge  ENMT:  No difficulty hearing, tinnitus, vertigo; No sinus or throat pain  NECK: No pain or stiffness  RESPIRATORY: No cough, wheezing, chills or hemoptysis; No shortness of breath  CARDIOVASCULAR: see above HPI   GASTROINTESTINAL: No abdominal or epigastric pain. No nausea, vomiting, diarrhea or constipation  GENITOURINARY: No dysuria, frequency, hematuria, or incontinence  NEUROLOGICAL: No headaches, memory loss, loss of strength, numbness, or tremors  SKIN: No itching, burning, rashes, or lesions   LYMPH NODES: No enlarged glands  ENDOCRINE: No heat or cold intolerance; No hair loss  MUSCULOSKELETAL: No joint pain or swelling; No muscle, back, or extremity pain  PSYCHIATRIC: No depression, anxiety, mood swings, or difficulty sleeping  HEME/LYMPH: No easy bruising, or bleeding gums  ALLERGY AND IMMUNOLOGIC: No hives or eczema    Allergies    No Known Drug Allergies  seasonal (Rhinorrhea; Eye Irritation)    Social History: ex-heavy smoker  switched to vaping 2 years ago  no IVDA  no ETOH abuse   works in the [employer]    FAMILY HISTORY:  aunt has Sjogren's syndrome  Family history of diabetes mellitus type II  Family history of stroke  Family history of coronary arteriosclerosis      MEDICATIONS  (STANDING):  colchicine 0.6 milliGRAM(s) Oral two times a day  ibuprofen  Tablet. 600 milliGRAM(s) Oral every 6 hours  pantoprazole    Tablet 40 milliGRAM(s) Oral before breakfast  sodium chloride 0.9%. 1000 milliLiter(s) (100 mL/Hr) IV Continuous <Continuous>    MEDICATIONS  (PRN):      CAPILLARY BLOOD GLUCOSE      POCT Blood Glucose.: 70 mg/dL (15 May 2020 06:28)    I&O's Summary      24hrs Vital:  T(C): 36.6 (05-15-20 @ 12:19), Max: 36.9 (05-14-20 @ 19:36)  HR: 68 (05-15-20 @ 12:19) (54 - 79)  BP: 91/53 (05-15-20 @ 12:19) (91/53 - 107/63)  RR: 17 (05-15-20 @ 12:19) (16 - 18)  SpO2: 100% (05-15-20 @ 12:19) (100% - 100%)    PHYSICAL EXAM: elinor Ding (PCA)  GENERAL: NAD, well-developed  HEAD:  Atraumatic, Normocephalic  EYES: EOMI, PERRLA, conjunctiva and sclera clear  NECK: Supple, No JVD  CHEST/LUNG: Clear to auscultation bilaterally; No wheeze  HEART: S1S2; No rubs, or gallops, no murmurs  ABDOMEN: Soft, Nontender, Nondistended; Bowel sounds present  EXTREMITIES:  + Peripheral Pulses, No clubbing or cyanosis, no edema  PSYCH: AO x 3,   NEUROLOGY: Alert, no focal motor or sensory deficits  SKIN: No rashes or lesions    LABS:                        12.7   8.16  )-----------( 253      ( 15 May 2020 06:40 )             38.1     05-15    138  |  103  |  5<L>  ----------------------------<  89  4.7   |  25  |  0.61    Ca    9.4      15 May 2020 06:40    TPro  8.0  /  Alb  5.2<H>  /  TBili  0.6  /  DBili  x   /  AST  19  /  ALT  13  /  AlkPhos  41  05-13    PT/INR - ( 13 May 2020 21:35 )   PT: 12.6 SEC;   INR: 1.09          PTT - ( 13 May 2020 21:35 )  PTT:27.6 SEC          RADIOLOGY & ADDITIONAL TESTS:    Consultant(s) Notes Reviewed:      Care Discussed with Consultants/Other Providers:

## 2020-05-15 NOTE — DISCHARGE NOTE NURSING/CASE MANAGEMENT/SOCIAL WORK - PATIENT PORTAL LINK FT
You can access the FollowMyHealth Patient Portal offered by [facility] by registering at the following website: [email or web address]. By joining [facility]’s FollowMyHealth portal, you will also be able to view your health information using other applications (apps) compatible with our system.

## 2020-05-15 NOTE — DISCHARGE NOTE PROVIDER - NSDCMRMEDTOKEN_GEN_ALL_CORE_FT
Cipro 500 mg oral tablet: 1 tab(s) orally 2 times a day colchicine 0.6 mg oral tablet: 1 tab(s) orally 2 times a day  ibuprofen 600 mg oral tablet: 1 tab(s) orally every 6 hours  pantoprazole 40 mg oral delayed release tablet: 1 tab(s) orally once a day (before a meal)

## 2020-05-16 LAB — RHEUMATOID FACT SERPL-ACNC: SIGNIFICANT CHANGE UP IU/ML (ref 0–13)

## 2020-05-18 LAB
DSDNA AB FLD-ACNC: <0.2 — SIGNIFICANT CHANGE UP
DSDNA AB SER-ACNC: <12 IU/ML — SIGNIFICANT CHANGE UP
ENA SS-A AB FLD IA-ACNC: <0.2 — SIGNIFICANT CHANGE UP

## 2020-05-21 LAB — ANA TITR SER: NEGATIVE — SIGNIFICANT CHANGE UP

## 2020-05-25 ENCOUNTER — INPATIENT (INPATIENT)
Facility: HOSPITAL | Age: 30
LOS: 1 days | Discharge: HOME | End: 2020-05-27
Attending: INTERNAL MEDICINE | Admitting: INTERNAL MEDICINE
Payer: COMMERCIAL

## 2020-05-25 VITALS
HEART RATE: 85 BPM | RESPIRATION RATE: 18 BRPM | OXYGEN SATURATION: 98 % | SYSTOLIC BLOOD PRESSURE: 97 MMHG | TEMPERATURE: 98 F | DIASTOLIC BLOOD PRESSURE: 62 MMHG

## 2020-05-25 DIAGNOSIS — K92.2 GASTROINTESTINAL HEMORRHAGE, UNSPECIFIED: ICD-10-CM

## 2020-05-25 DIAGNOSIS — K92.1 MELENA: ICD-10-CM

## 2020-05-25 DIAGNOSIS — I31.9 DISEASE OF PERICARDIUM, UNSPECIFIED: ICD-10-CM

## 2020-05-25 LAB
ALBUMIN SERPL ELPH-MCNC: 4.9 G/DL — SIGNIFICANT CHANGE UP (ref 3.3–5)
ALP SERPL-CCNC: 36 U/L — LOW (ref 40–120)
ALT FLD-CCNC: 15 U/L — SIGNIFICANT CHANGE UP (ref 4–33)
ANION GAP SERPL CALC-SCNC: 14 MMO/L — SIGNIFICANT CHANGE UP (ref 7–14)
APTT BLD: 27.6 SEC — SIGNIFICANT CHANGE UP (ref 27.5–36.3)
AST SERPL-CCNC: 15 U/L — SIGNIFICANT CHANGE UP (ref 4–32)
BASOPHILS # BLD AUTO: 0.02 K/UL — SIGNIFICANT CHANGE UP (ref 0–0.2)
BASOPHILS NFR BLD AUTO: 0.2 % — SIGNIFICANT CHANGE UP (ref 0–2)
BILIRUB SERPL-MCNC: 0.2 MG/DL — SIGNIFICANT CHANGE UP (ref 0.2–1.2)
BLD GP AB SCN SERPL QL: NEGATIVE — SIGNIFICANT CHANGE UP
BUN SERPL-MCNC: 10 MG/DL — SIGNIFICANT CHANGE UP (ref 7–23)
CALCIUM SERPL-MCNC: 9.6 MG/DL — SIGNIFICANT CHANGE UP (ref 8.4–10.5)
CHLORIDE SERPL-SCNC: 99 MMOL/L — SIGNIFICANT CHANGE UP (ref 98–107)
CO2 SERPL-SCNC: 21 MMOL/L — LOW (ref 22–31)
CREAT SERPL-MCNC: 0.78 MG/DL — SIGNIFICANT CHANGE UP (ref 0.5–1.3)
EOSINOPHIL # BLD AUTO: 0.09 K/UL — SIGNIFICANT CHANGE UP (ref 0–0.5)
EOSINOPHIL NFR BLD AUTO: 1 % — SIGNIFICANT CHANGE UP (ref 0–6)
GLUCOSE SERPL-MCNC: 89 MG/DL — SIGNIFICANT CHANGE UP (ref 70–99)
HCT VFR BLD CALC: 38.1 % — SIGNIFICANT CHANGE UP (ref 34.5–45)
HGB BLD-MCNC: 12.9 G/DL — SIGNIFICANT CHANGE UP (ref 11.5–15.5)
IMM GRANULOCYTES NFR BLD AUTO: 0.2 % — SIGNIFICANT CHANGE UP (ref 0–1.5)
INR BLD: 1.03 — SIGNIFICANT CHANGE UP (ref 0.88–1.17)
LYMPHOCYTES # BLD AUTO: 2.9 K/UL — SIGNIFICANT CHANGE UP (ref 1–3.3)
LYMPHOCYTES # BLD AUTO: 32.1 % — SIGNIFICANT CHANGE UP (ref 13–44)
MCHC RBC-ENTMCNC: 30.9 PG — SIGNIFICANT CHANGE UP (ref 27–34)
MCHC RBC-ENTMCNC: 33.9 % — SIGNIFICANT CHANGE UP (ref 32–36)
MCV RBC AUTO: 91.1 FL — SIGNIFICANT CHANGE UP (ref 80–100)
MONOCYTES # BLD AUTO: 0.79 K/UL — SIGNIFICANT CHANGE UP (ref 0–0.9)
MONOCYTES NFR BLD AUTO: 8.7 % — SIGNIFICANT CHANGE UP (ref 2–14)
NEUTROPHILS # BLD AUTO: 5.22 K/UL — SIGNIFICANT CHANGE UP (ref 1.8–7.4)
NEUTROPHILS NFR BLD AUTO: 57.8 % — SIGNIFICANT CHANGE UP (ref 43–77)
NRBC # FLD: 0 K/UL — SIGNIFICANT CHANGE UP (ref 0–0)
OB PNL STL: POSITIVE — SIGNIFICANT CHANGE UP
PLATELET # BLD AUTO: 270 K/UL — SIGNIFICANT CHANGE UP (ref 150–400)
PMV BLD: 10.9 FL — SIGNIFICANT CHANGE UP (ref 7–13)
POTASSIUM SERPL-MCNC: 3.8 MMOL/L — SIGNIFICANT CHANGE UP (ref 3.5–5.3)
POTASSIUM SERPL-SCNC: 3.8 MMOL/L — SIGNIFICANT CHANGE UP (ref 3.5–5.3)
PROT SERPL-MCNC: 7.6 G/DL — SIGNIFICANT CHANGE UP (ref 6–8.3)
PROTHROM AB SERPL-ACNC: 11.8 SEC — SIGNIFICANT CHANGE UP (ref 9.8–13.1)
RBC # BLD: 4.18 M/UL — SIGNIFICANT CHANGE UP (ref 3.8–5.2)
RBC # FLD: 11.9 % — SIGNIFICANT CHANGE UP (ref 10.3–14.5)
RH IG SCN BLD-IMP: POSITIVE — SIGNIFICANT CHANGE UP
SODIUM SERPL-SCNC: 134 MMOL/L — LOW (ref 135–145)
WBC # BLD: 9.04 K/UL — SIGNIFICANT CHANGE UP (ref 3.8–10.5)
WBC # FLD AUTO: 9.04 K/UL — SIGNIFICANT CHANGE UP (ref 3.8–10.5)

## 2020-05-25 PROCEDURE — 99223 1ST HOSP IP/OBS HIGH 75: CPT

## 2020-05-25 RX ORDER — FAMOTIDINE 10 MG/ML
20 INJECTION INTRAVENOUS ONCE
Refills: 0 | Status: COMPLETED | OUTPATIENT
Start: 2020-05-25 | End: 2020-05-25

## 2020-05-25 RX ORDER — PANTOPRAZOLE SODIUM 20 MG/1
40 TABLET, DELAYED RELEASE ORAL
Refills: 0 | Status: DISCONTINUED | OUTPATIENT
Start: 2020-05-25 | End: 2020-05-27

## 2020-05-25 RX ORDER — COLCHICINE 0.6 MG
0.6 TABLET ORAL
Refills: 0 | Status: DISCONTINUED | OUTPATIENT
Start: 2020-05-25 | End: 2020-05-27

## 2020-05-25 RX ADMIN — Medication 0.6 MILLIGRAM(S): at 22:21

## 2020-05-25 RX ADMIN — PANTOPRAZOLE SODIUM 40 MILLIGRAM(S): 20 TABLET, DELAYED RELEASE ORAL at 22:03

## 2020-05-25 RX ADMIN — FAMOTIDINE 20 MILLIGRAM(S): 10 INJECTION INTRAVENOUS at 19:42

## 2020-05-25 NOTE — H&P ADULT - NSHPLABSRESULTS_GEN_ALL_CORE
05-25    134<L>  |  99  |  10  ----------------------------<  89  3.8   |  21<L>  |  0.78    Ca    9.6      25 May 2020 18:28    TPro  7.6  /  Alb  4.9  /  TBili  0.2  /  DBili  x   /  AST  15  /  ALT  15  /  AlkPhos  36<L>  05-25                        12.9   9.04  )-----------( 270      ( 25 May 2020 18:28 )             38.1     LIVER FUNCTIONS - ( 25 May 2020 18:28 )  Alb: 4.9 g/dL / Pro: 7.6 g/dL / ALK PHOS: 36 u/L / ALT: 15 u/L / AST: 15 u/L / GGT: x           PT/INR - ( 25 May 2020 18:28 )   PT: 11.8 SEC;   INR: 1.03       PTT - ( 25 May 2020 18:28 )  PTT:27.6 SEC

## 2020-05-25 NOTE — H&P ADULT - NSHPREVIEWOFSYSTEMS_GEN_ALL_CORE
Constitutional Symptoms: No weakness, fevers, chills, weight loss  Eyes: No visual changes, headache, eye pain, double vision  Ears, Nose, Mouth, Throat: No runny nose, sinus pain, ear pain, tinnitus, sore throat, dysphagia, odynophagia  Cardiovascular: No chest pain, palpitations, edema  Respiratory: No cough, wheezing, hemoptysis, shortness of breath  Gastrointestinal: +abdominal pain, melena, no dysphagia, anorexia, nausea/vomiting, diarrhea/constipation, hematemesis, BRBPR  Genitourinary: No dysuria, frequency, hematuria  Musculoskeletal: No joint pain, joint swelling, decreased ROM  Skin: No pruritus, rashes, lesions, wounds  Neurologic:  No seizures, headache, paraesthesia, numbness, limb weakness    Positives and pertinent negatives noted and all other systems negative.

## 2020-05-25 NOTE — ED PROVIDER NOTE - CLINICAL SUMMARY MEDICAL DECISION MAKING FREE TEXT BOX
30 y/o female on motrin and colchicine for pericardidits now w/ epigastric pain and black stool x3 days- concern for possible GI bleed- will check labs, occult and reassess

## 2020-05-25 NOTE — H&P ADULT - PROBLEM SELECTOR PLAN 1
likely upper GIB 2/2 recent NSAID use, patient denies any new medications, change in diet, will keep NPO, hold NSAIDs, start protonix 40 mg IV BID, GI in AM for EGD

## 2020-05-25 NOTE — ED ADULT TRIAGE NOTE - CHIEF COMPLAINT QUOTE
p/t recently diagnosed with pericarditis on Motrin 600mg q6hr, c/o of diffuse abd discomfort and dark stools for 3 days, p/t denies any chest pain denies sob

## 2020-05-25 NOTE — H&P ADULT - HISTORY OF PRESENT ILLNESS
Patient is a 30 y/o F PMH recent pericarditis p/w abdominal pain, melena X3 days. Admitted 5/14-5/15 for pericarditis, discharged w/ colchicine, Ibuprofen and protonix. Patient reports pericarditis symptoms have improved, followed up with Cardiology outpatient on 5/19, had TTE and EKG performed, unknown results, tapered down her ibuprofen to 1200 mg per day, symptoms started returning on 5/22, patient reincreased her ibuprofen to original dosage. Epigastric pain and melena developed 3 days ago. Reports 1 episode of melena per day, last episode this AM.

## 2020-05-25 NOTE — ED PROVIDER NOTE - ATTENDING CONTRIBUTION TO CARE
Dr Bloch, ATTENDING MD-  I performed a face to face bedside interview with patient regarding history of present illness, review of symptoms and past medical history. I completed an independent physical exam.  I have discussed patient's plan of care with PA.   Documentation as above in the note.  Patient examined , well appearing, NAD, HEENT nml conjunctiva, nml PERRL, 2-12 intact, heart sounds no mgr, mild left rhomboid tenderness, no CVA tenderness. abd soft mild epigastric tenderness.

## 2020-05-25 NOTE — ED PROVIDER NOTE - OBJECTIVE STATEMENT
30 y/o female no pmh c/o abd pain and black stool x3 days. Pt had recent admission for pericarditis. Pt was dc x10 days ago on colchicine .6 BID and Motrin 600mg QID. Pt has also been taking protonix 40mg daily. Pt states that she developed epigastric abd pain and black stools x3 days. pt denies chest pain, sob, n/v, dysuria, numbness, tingling, weakness, dizziness, syncope, fever or chills.

## 2020-05-25 NOTE — ED ADULT NURSE NOTE - OBJECTIVE STATEMENT
pt received intake spot 8. pt A+Ox4 states she was diagnosed with pericarditis and has been taking motrin q6h. now c/o epigastric pain with dark black stools. abd soft nondistended. respirations even and unlabored. vs as noted. labs sent. IVSL in place. awaiting further orders. will monitor.

## 2020-05-25 NOTE — H&P ADULT - NSHPPHYSICALEXAM_GEN_ALL_CORE
T(C): 36.7 (05-25-20 @ 20:43), Max: 36.9 (05-25-20 @ 20:25)  HR: 67 (05-25-20 @ 20:43) (67 - 85)  BP: 107/55 (05-25-20 @ 20:43) (97/62 - 108/77)  RR: 20 (05-25-20 @ 20:43) (18 - 20)  SpO2: 96% (05-25-20 @ 20:43) (96% - 100%)    Constitutional: NAD, well-developed, well-nourished  Ears, Nose, Mouth, and Throat: normal external ears and nose, normal hearing, moist oral mucosa  Eyes: normal conjunctiva, EOMI, PERRL  Neck: supple, no JVD  Respiratory: Clear to auscultation bilaterally. No wheezes, rales or rhonchi. Normal respiratory effort  Cardiovascular: RRR, no M/R/G, no edema, 2+ Peripheral Pulses  Gastrointestinal: soft, nontender, nondistended, +BS, no hernia  Skin: warm, dry, no rash  Neurologic: sensation grossly intact, CN grossly intact, non-focal exam  Musculoskeletal: no clubbing, no cyanosis, no joint swelling  Psychiatric: AOX3, appropriate mood, affect

## 2020-05-25 NOTE — ED ADULT NURSE REASSESSMENT NOTE - NS ED NURSE REASSESS COMMENT FT1
Received report from caio RN. Pt. a&ox4, in no acute distress. VS as noted. Respirations even and unlabored. Report given to [facility] RN. Pt. being transported to [facility].

## 2020-05-26 LAB
ALBUMIN SERPL ELPH-MCNC: 4.4 G/DL — SIGNIFICANT CHANGE UP (ref 3.3–5)
ALP SERPL-CCNC: 34 U/L — LOW (ref 40–120)
ALT FLD-CCNC: 13 U/L — SIGNIFICANT CHANGE UP (ref 4–33)
ANION GAP SERPL CALC-SCNC: 13 MMO/L — SIGNIFICANT CHANGE UP (ref 7–14)
AST SERPL-CCNC: 12 U/L — SIGNIFICANT CHANGE UP (ref 4–32)
BASOPHILS # BLD AUTO: 0.01 K/UL — SIGNIFICANT CHANGE UP (ref 0–0.2)
BASOPHILS NFR BLD AUTO: 0.1 % — SIGNIFICANT CHANGE UP (ref 0–2)
BILIRUB SERPL-MCNC: 0.4 MG/DL — SIGNIFICANT CHANGE UP (ref 0.2–1.2)
BUN SERPL-MCNC: 11 MG/DL — SIGNIFICANT CHANGE UP (ref 7–23)
CALCIUM SERPL-MCNC: 9.5 MG/DL — SIGNIFICANT CHANGE UP (ref 8.4–10.5)
CHLORIDE SERPL-SCNC: 102 MMOL/L — SIGNIFICANT CHANGE UP (ref 98–107)
CO2 SERPL-SCNC: 24 MMOL/L — SIGNIFICANT CHANGE UP (ref 22–31)
CREAT SERPL-MCNC: 0.7 MG/DL — SIGNIFICANT CHANGE UP (ref 0.5–1.3)
EOSINOPHIL # BLD AUTO: 0.16 K/UL — SIGNIFICANT CHANGE UP (ref 0–0.5)
EOSINOPHIL NFR BLD AUTO: 1.9 % — SIGNIFICANT CHANGE UP (ref 0–6)
GLUCOSE BLDC GLUCOMTR-MCNC: 82 MG/DL — SIGNIFICANT CHANGE UP (ref 70–99)
GLUCOSE SERPL-MCNC: 80 MG/DL — SIGNIFICANT CHANGE UP (ref 70–99)
HCG UR-SCNC: NEGATIVE — SIGNIFICANT CHANGE UP
HCT VFR BLD CALC: 39.5 % — SIGNIFICANT CHANGE UP (ref 34.5–45)
HCT VFR BLD CALC: 39.7 % — SIGNIFICANT CHANGE UP (ref 34.5–45)
HGB BLD-MCNC: 13.1 G/DL — SIGNIFICANT CHANGE UP (ref 11.5–15.5)
HGB BLD-MCNC: 13.5 G/DL — SIGNIFICANT CHANGE UP (ref 11.5–15.5)
IMM GRANULOCYTES NFR BLD AUTO: 0.1 % — SIGNIFICANT CHANGE UP (ref 0–1.5)
LYMPHOCYTES # BLD AUTO: 3.72 K/UL — HIGH (ref 1–3.3)
LYMPHOCYTES # BLD AUTO: 43.9 % — SIGNIFICANT CHANGE UP (ref 13–44)
MAGNESIUM SERPL-MCNC: 2.2 MG/DL — SIGNIFICANT CHANGE UP (ref 1.6–2.6)
MCHC RBC-ENTMCNC: 30.3 PG — SIGNIFICANT CHANGE UP (ref 27–34)
MCHC RBC-ENTMCNC: 31.3 PG — SIGNIFICANT CHANGE UP (ref 27–34)
MCHC RBC-ENTMCNC: 33.2 % — SIGNIFICANT CHANGE UP (ref 32–36)
MCHC RBC-ENTMCNC: 34 % — SIGNIFICANT CHANGE UP (ref 32–36)
MCV RBC AUTO: 91.4 FL — SIGNIFICANT CHANGE UP (ref 80–100)
MCV RBC AUTO: 92.1 FL — SIGNIFICANT CHANGE UP (ref 80–100)
MONOCYTES # BLD AUTO: 0.59 K/UL — SIGNIFICANT CHANGE UP (ref 0–0.9)
MONOCYTES NFR BLD AUTO: 7 % — SIGNIFICANT CHANGE UP (ref 2–14)
NEUTROPHILS # BLD AUTO: 3.99 K/UL — SIGNIFICANT CHANGE UP (ref 1.8–7.4)
NEUTROPHILS NFR BLD AUTO: 47 % — SIGNIFICANT CHANGE UP (ref 43–77)
NRBC # FLD: 0 K/UL — SIGNIFICANT CHANGE UP (ref 0–0)
NRBC # FLD: 0 K/UL — SIGNIFICANT CHANGE UP (ref 0–0)
PHOSPHATE SERPL-MCNC: 3.9 MG/DL — SIGNIFICANT CHANGE UP (ref 2.5–4.5)
PLATELET # BLD AUTO: 260 K/UL — SIGNIFICANT CHANGE UP (ref 150–400)
PLATELET # BLD AUTO: 287 K/UL — SIGNIFICANT CHANGE UP (ref 150–400)
PMV BLD: 10.7 FL — SIGNIFICANT CHANGE UP (ref 7–13)
PMV BLD: 10.9 FL — SIGNIFICANT CHANGE UP (ref 7–13)
POTASSIUM SERPL-MCNC: 3.7 MMOL/L — SIGNIFICANT CHANGE UP (ref 3.5–5.3)
POTASSIUM SERPL-SCNC: 3.7 MMOL/L — SIGNIFICANT CHANGE UP (ref 3.5–5.3)
PROT SERPL-MCNC: 6.7 G/DL — SIGNIFICANT CHANGE UP (ref 6–8.3)
RBC # BLD: 4.31 M/UL — SIGNIFICANT CHANGE UP (ref 3.8–5.2)
RBC # BLD: 4.32 M/UL — SIGNIFICANT CHANGE UP (ref 3.8–5.2)
RBC # FLD: 11.9 % — SIGNIFICANT CHANGE UP (ref 10.3–14.5)
RBC # FLD: 11.9 % — SIGNIFICANT CHANGE UP (ref 10.3–14.5)
SARS-COV-2 RNA SPEC QL NAA+PROBE: SIGNIFICANT CHANGE UP
SODIUM SERPL-SCNC: 139 MMOL/L — SIGNIFICANT CHANGE UP (ref 135–145)
SP GR UR: 1.01 — SIGNIFICANT CHANGE UP (ref 1–1.04)
WBC # BLD: 8.48 K/UL — SIGNIFICANT CHANGE UP (ref 3.8–10.5)
WBC # BLD: 8.89 K/UL — SIGNIFICANT CHANGE UP (ref 3.8–10.5)
WBC # FLD AUTO: 8.48 K/UL — SIGNIFICANT CHANGE UP (ref 3.8–10.5)
WBC # FLD AUTO: 8.89 K/UL — SIGNIFICANT CHANGE UP (ref 3.8–10.5)

## 2020-05-26 PROCEDURE — 74177 CT ABD & PELVIS W/CONTRAST: CPT | Mod: 26

## 2020-05-26 RX ORDER — SUCRALFATE 1 G
1 TABLET ORAL
Refills: 0 | Status: DISCONTINUED | OUTPATIENT
Start: 2020-05-26 | End: 2020-05-27

## 2020-05-26 RX ADMIN — Medication 1 GRAM(S): at 18:38

## 2020-05-26 RX ADMIN — PANTOPRAZOLE SODIUM 40 MILLIGRAM(S): 20 TABLET, DELAYED RELEASE ORAL at 18:38

## 2020-05-26 RX ADMIN — PANTOPRAZOLE SODIUM 40 MILLIGRAM(S): 20 TABLET, DELAYED RELEASE ORAL at 06:46

## 2020-05-26 RX ADMIN — Medication 0.6 MILLIGRAM(S): at 06:46

## 2020-05-26 RX ADMIN — Medication 0.6 MILLIGRAM(S): at 18:38

## 2020-05-26 NOTE — PROGRESS NOTE ADULT - SUBJECTIVE AND OBJECTIVE BOX
Patient is a 29y old  Female who presents with a chief complaint of melena (26 May 2020 10:59)  patient known to me, assigned this AM to assume care  chart reviewed and events thus far noted   admitted overnight by full time hospitalist service     SUBJECTIVE / OVERNIGHT EVENTS: overnight events noted    ROS:  Resp: No cough no sputum production  CVS: No chest pain no palpitations no orthopnea  GI: no N/V/D no BM  : no dysuria, no hematuria  Neuro: no weakness no paresthesias  Heme: No petechiae no easy bruising  Msk: No joint pain no swelling  Skin: No rash no itching        MEDICATIONS  (STANDING):  colchicine 0.6 milliGRAM(s) Oral two times a day  pantoprazole  Injectable 40 milliGRAM(s) IV Push two times a day  sucralfate 1 Gram(s) Oral two times a day    MEDICATIONS  (PRN):  aluminum hydroxide/magnesium hydroxide/simethicone Suspension 30 milliLiter(s) Oral every 6 hours PRN Dyspepsia        CAPILLARY BLOOD GLUCOSE      POCT Blood Glucose.: 82 mg/dL (26 May 2020 06:50)    I&O's Summary      Vital Signs Last 24 Hrs  T(C): 36.7 (26 May 2020 11:59), Max: 36.9 (25 May 2020 20:25)  T(F): 98.1 (26 May 2020 11:59), Max: 98.5 (25 May 2020 20:25)  HR: 68 (26 May 2020 11:59) (59 - 85)  BP: 115/76 (26 May 2020 11:59) (97/62 - 116/70)  BP(mean): --  RR: 18 (26 May 2020 11:59) (18 - 20)  SpO2: 100% (26 May 2020 11:59) (96% - 100%)    PHYSICAL EXAM: chaperone RN at bedside   GENERAL: in no apparent distress  HEAD:  Atraumatic, Normocephalic  EYES: EOMI, PERRLA, conjunctiva and sclera clear  NECK: Supple, No JVD  CHEST/LUNG: no wheeze, clear   HEART: S1 S2; No rubs, no murmurs appreciated  ABDOMEN: Soft, Nontender, Bowel sounds present  EXTREMITIES:  No clubbing or cyanosis, + Peripheral Pulses,  no edema  PSYCH: AO x 3 appropriate affect  NEUROLOGY: non-focal, motor and sensory systems intact  SKIN: No rashes or lesions    LABS:                        13.1   8.48  )-----------( 260      ( 26 May 2020 05:24 )             39.5     05-26    139  |  102  |  11  ----------------------------<  80  3.7   |  24  |  0.70    Ca    9.5      26 May 2020 05:24  Phos  3.9     05-26  Mg     2.2     05-26    TPro  6.7  /  Alb  4.4  /  TBili  0.4  /  DBili  x   /  AST  12  /  ALT  13  /  AlkPhos  34<L>  05-26    PT/INR - ( 25 May 2020 18:28 )   PT: 11.8 SEC;   INR: 1.03          PTT - ( 25 May 2020 18:28 )  PTT:27.6 SEC            All consultant(s) notes reviewed and care discussed with other providers        Contact Number, Dr Bassett [phone]

## 2020-05-26 NOTE — PROGRESS NOTE ADULT - PROBLEM SELECTOR PLAN 1
likely upper GI bleed from gastritis secondary to NSAIDs  resolved  guaiac positive stool not unexpected  hemoglobin stable  GI evaluation pending  will continue PPI IV  advance diet to regular  continue to hold NSAIDs  likely discharge home tomorrow +/- EGD (if symptoms recur)

## 2020-05-26 NOTE — CONSULT NOTE ADULT - SUBJECTIVE AND OBJECTIVE BOX
Chief Complaint:  Patient is a 29y old  Female who presents with a chief complaint of melena (26 May 2020 12:06)    Pericarditis  Anxiety  No significant past surgical history     HPI:  Patient is a 28 y/o F PMH recent pericarditis p/w abdominal pain, melena X3 days. Admitted -5/15 for pericarditis, discharged w/ colchicine, Ibuprofen and protonix. Patient reports pericarditis symptoms have improved, followed up with Cardiology outpatient on , had TTE and EKG performed, unknown results, tapered down her ibuprofen to 1200 mg per day, symptoms started returning on , patient reincreased her ibuprofen to original dosage. Epigastric pain and melena developed 3 days ago. Reports 1 episode of melena per day, last episode this AM. (25 May 2020 21:31) GI consulted for melena and epigastric pain improved after PO intake. She states the melena has been for about 3-4 days and she has been with increased ibuprofen use. She also is expressing concern of a 30lb unintentional weight loss despite having a strong appetite. Her grandmother  of pancreatic cancer. She has never had an egd/colonoscopy in the past.       No Known Drug Allergies  seasonal (Rhinorrhea; Eye Irritation)      aluminum hydroxide/magnesium hydroxide/simethicone Suspension 30 milliLiter(s) Oral every 6 hours PRN  colchicine 0.6 milliGRAM(s) Oral two times a day  pantoprazole  Injectable 40 milliGRAM(s) IV Push two times a day  sucralfate 1 Gram(s) Oral two times a day        FAMILY HISTORY:  Family history of diabetes mellitus type II  Family history of stroke  Family history of coronary arteriosclerosis        Review of Systems:    General:  No wt loss, fevers, chills, night sweats, fatigue  Eyes:  Good vision, no reported pain  ENT:  No sore throat, pain, runny nose, dysphagia  CV:  No pain, palpitations, no lightheadedness  Resp:  No dyspnea, cough, tachypnea, wheezing  GI: melena, epigastric pain and weight loss of 30lb  :  No pain, bleeding, incontinence, nocturia  Muscle:  No pain, weakness  Neuro:  No weakness, tingling, memory problems  Psych:  No fatigue, insomnia, mood problems, depression  Endocrine:  No polyuria, polydypsia, cold/heat intolerance  Heme:  No petechiae, ecchymosis, easy bruisability  Skin:  No rash, tattoos, scars, edema    Relevant Family History:   n/c    Relevant Social History: n/c      Physical Exam:    Vital Signs:  Vital Signs Last 24 Hrs  T(C): 36.7 (26 May 2020 11:59), Max: 36.9 (25 May 2020 20:25)  T(F): 98.1 (26 May 2020 11:59), Max: 98.5 (25 May 2020 20:25)  HR: 68 (26 May 2020 11:59) (59 - 85)  BP: 115/76 (26 May 2020 11:59) (97/62 - 116/70)  BP(mean): --  RR: 18 (26 May 2020 11:59) (18 - 20)  SpO2: 100% (26 May 2020 11:59) (96% - 100%)  Daily Height in cm: 157.48 (25 May 2020 21:38)    Daily     General:  Appears stated age, well-groomed, nad  HEENT:  NC/AT,  conjunctivae clear and pink, no thyromegaly, nodules, adenopathy, no JVD  Chest:  Full & symmetric excursion, no increased effort, breath sounds clear  Cardiovascular:  Regular rhythm, S1, S2, no murmur/rub/S3/S4, no abdominal bruit, no edema  Abdomen:  Soft, non-tender, non-distended, normoactive bowel sounds,  no masses ,no hepatosplenomeagaly, no signs of chronic liver disease  Extremities:  no cyanosis,clubbing or edema  Skin:  No rash/erythema/ecchymoses/petechiae/wounds/abscess/warm/dry  Neuro/Psych:  A&Ox3  , no asterixis, no tremor, no encephalopathy    Laboratory:                            13.1   8.48  )-----------( 260      ( 26 May 2020 05:24 )             39.5     05-26    139  |  102  |  11  ----------------------------<  80  3.7   |  24  |  0.70    Ca    9.5      26 May 2020 05:24  Phos  3.9     05-26  Mg     2.2     05-26    TPro  6.7  /  Alb  4.4  /  TBili  0.4  /  DBili  x   /  AST  12  /  ALT  13  /  AlkPhos  34<L>  05-26    LIVER FUNCTIONS - ( 26 May 2020 05:24 )  Alb: 4.4 g/dL / Pro: 6.7 g/dL / ALK PHOS: 34 u/L / ALT: 13 u/L / AST: 12 u/L / GGT: x           PT/INR - ( 25 May 2020 18:28 )   PT: 11.8 SEC;   INR: 1.03          PTT - ( 25 May 2020 18:28 )  PTT:27.6 SEC      Imaging:

## 2020-05-26 NOTE — PROGRESS NOTE ADULT - PROBLEM SELECTOR PLAN 2
continue to hold hold ibuprofen, c/w colchicine  will d/w cardiology attending if NSAIDs need to be resumed definitively  basic rheum work up negative last admission

## 2020-05-26 NOTE — CONSULT NOTE ADULT - ASSESSMENT
30 y/o F PMH recent pericarditis p/w abdominal pain, melena X3 days.    Melena   2/2 NSAID use, likely PUD   cont to hold NSAIDS   trend h/h; stable   cont Protonix IVP while inpatient   change to protonix 40mg PO BID on discharge   carafate 1g PO BID   given stability in h/h and active cardiac issues will defer egd and treat medically for now  diet as tolerated   outpt fu in our office in 2 weeks; [phone]    Unintentional weight loss   pt concerned of a 30lb weight loss   CT A/P pending   can be done as outpt but pt is wishing for inpt     Pericarditis   continue management per cardiology     Advanced care planning was discussed with patient and family.  Advanced care planning forms were reviewed and discussed.  Risks, benefits and alternatives of gastroenterologic procedures were discussed in detail and all questions were answered.  30 minutes spent.

## 2020-05-26 NOTE — CONSULT NOTE ADULT - ATTENDING COMMENTS
Patient seen and examined.  Agree with above.   Pericarditic symptoms have improved  Continue to hold NSAIDS given guiac positive stool  Continue with colchicine  Follow up GI    Bree Carranza MD

## 2020-05-26 NOTE — CONSULT NOTE ADULT - SUBJECTIVE AND OBJECTIVE BOX
HISTORY OF PRESENT ILLNESS: HPI:    29 year old female with no PMH presents to ER with c/o melena. She was diagnosed with pericarditis at an urgent care center, was seen recently at [facility] for work up and dx with pericarditis.   Admitted 5/14-5/15 for pericarditis, discharged w/ colchicine, Ibuprofen and protonix and d/c with outpatient f/u in our office. Patient reports pericarditis symptoms have improved, followed up with Cardiology outpatient on 5/19, had TTE and EKG performed, results pending. Epigastric pain and melena developed 3 days ago. Reports 1 episode of melena per day, last episode this yesterday. Denies chest pain, sob, palpitations, dizziness or syncope. Cardiology following for management of recent dx of  pericarditis.       PAST MEDICAL & SURGICAL HISTORY:  Pericarditis  Anxiety  No significant past surgical history      MEDICATIONS:  MEDICATIONS  (STANDING):  colchicine 0.6 milliGRAM(s) Oral two times a day  pantoprazole  Injectable 40 milliGRAM(s) IV Push two times a day      Allergies    No Known Drug Allergies  seasonal (Rhinorrhea; Eye Irritation)    Intolerances      FAMILY HISTORY:  Family history of diabetes mellitus type II  Family history of stroke  Family history of coronary arteriosclerosis    Non-contributary for premature coronary disease or sudden cardiac death    SOCIAL HISTORY:    [X ] Non-smoker  [ ] Smoker  [ ] Alcohol    FLU VACCINE THIS YEAR STARTS IN AUGUST:  [ ] Yes    [ X] No    IF OVER 65 HAVE YOU EVER HAD A PNA VACCINE:  [ ] Yes    [ ] No       [ X] N/A      REVIEW OF SYSTEMS:  [ ]chest pain  [  ]shortness of breath  [  ]palpitations  [  ]syncope  [ ]near syncope [ ]upper extremity weakness   [ ] lower extremity weakness  [  ]diplopia  [  ]altered mental status   [  ]fevers  [ ]chills [ ]nausea  [ ]vomitting  [  ]dysphagia    [ ]abdominal pain  [ ]melena  [ ]BRBPR    [  ]epistaxis  [  ]rash    [ ]lower extremity edema        [ ] All others negative	  [ ] Unable to obtain      LABS:	 	    CARDIAC MARKERS:                        13.1   8.48  )-----------( 260      ( 26 May 2020 05:24 )             39.5     Hb Trend: 13.1<--, 12.9<--    05-26    139  |  102  |  11  ----------------------------<  80  3.7   |  24  |  0.70    Ca    9.5      26 May 2020 05:24  Phos  3.9     05-26  Mg     2.2     05-26    TPro  6.7  /  Alb  4.4  /  TBili  0.4  /  DBili  x   /  AST  12  /  ALT  13  /  AlkPhos  34<L>  05-26    Creatinine Trend: 0.70<--, 0.78<--, 0.61<--, 0.73<--    Coags:  PT/INR - ( 25 May 2020 18:28 )   PT: 11.8 SEC;   INR: 1.03          PTT - ( 25 May 2020 18:28 )  PTT:27.6 SEC    proBNP:   Lipid Profile:   HgA1c:   TSH:       PHYSICAL EXAM:  T(C): 36.6 (05-26-20 @ 06:45), Max: 36.9 (05-25-20 @ 20:25)  HR: 59 (05-26-20 @ 06:45) (59 - 85)  BP: 97/63 (05-26-20 @ 06:45) (97/62 - 116/70)  RR: 18 (05-26-20 @ 06:45) (18 - 20)  SpO2: 100% (05-26-20 @ 06:45) (96% - 100%)  Wt(kg): --   BMI (kg/m2): 15.6 (05-25-20 @ 21:38)  I&O's Summary      Gen: Appears well in NAD  HEENT:  (-)icterus (-)pallor  CV: N S1 S2 1/6 KELLY (+)2 Pulses B/l  Resp:  Clear to auscultation B/L, normal effort  GI: (+) BS Soft, NT, ND  Lymph:  (-)Edema, (-)obvious lymphadenopathy  Skin: Warm to touch, Normal turgor  Psych: Appropriate mood and affect      TELEMETRY: 	  Not on telemetry     ECG:  	    < from: 12 Lead ECG (05.15.20 @ 12:34) >  Sinus bradycardia  Nonspecific T wave abnormality  Abnormal ECG    < end of copied text >  RADIOLOGY:         CXR:     < from: Transthoracic Echocardiogram (05.14.20 @ 11:12) >  CONCLUSIONS:  1. Normal mitral valve. Minimal mitral regurgitation.  2. Normal left ventricular internal dimensions and wall  thicknesses.  3. Normal left ventricular systolic function. No segmental  wall motion abnormalities.  4. Normal left ventricular diastolic function.  5. Normal right ventricular size and function.  ------------------------------------------------------------------------  Confirmed on  5/14/2020 - 13:58:58 by Bryn Beltrán M.D.    < end of copied text >      ASSESSMENT/PLAN: 	    29 year old female with no PMH presents to ER with c/o melena. She was diagnosed with pericarditis at an urgent care center, was seen recently at [facility] for work up and dx with pericarditis.   Admitted 5/14-5/15 for pericarditis, discharged w/ colchicine, Ibuprofen and protonix and d/c with outpatient f/u in our office. Patient reports pericarditis symptoms have improved, followed up with Cardiology outpatient on 5/19, had TTE and EKG performed, results pending. Epigastric pain and melena developed 3 days ago. Reports 1 episode of melena per day, last episode this yesterday. Cardiology following for management of recent dx of  pericarditis.       -- pt currently chest pain free   -- recent echo with normal lv sys function, no seg wma   -- h/h stable  -- f/u GI reccs, no need for EGD per medicine attending as discussed with GI   -- pt to follow in our office after d/c HISTORY OF PRESENT ILLNESS: HPI:    29 year old female with no PMH presents to ER with c/o melena. She was diagnosed with pericarditis at an urgent care center, was seen recently at [facility] for work up and dx with pericarditis.   Admitted 5/14-5/15 for pericarditis, discharged w/ colchicine, Ibuprofen and protonix and d/c with outpatient f/u in our office. Patient reports pericarditis symptoms have improved, followed up with Cardiology outpatient on 5/19, had TTE and EKG performed, results pending. Epigastric pain and melena developed 3 days ago. Reports 1 episode of melena per day, last episode this yesterday. Denies chest pain, sob, palpitations, dizziness or syncope. Cardiology following for management of recent dx of  pericarditis.       PAST MEDICAL & SURGICAL HISTORY:  Pericarditis  Anxiety  No significant past surgical history      MEDICATIONS:  MEDICATIONS  (STANDING):  colchicine 0.6 milliGRAM(s) Oral two times a day  pantoprazole  Injectable 40 milliGRAM(s) IV Push two times a day      Allergies    No Known Drug Allergies  seasonal (Rhinorrhea; Eye Irritation)    Intolerances      FAMILY HISTORY:  Family history of diabetes mellitus type II  Family history of stroke  Family history of coronary arteriosclerosis    Non-contributary for premature coronary disease or sudden cardiac death    SOCIAL HISTORY:    [X ] Non-smoker  [ ] Smoker  [ ] Alcohol    FLU VACCINE THIS YEAR STARTS IN AUGUST:  [ ] Yes    [ X] No    IF OVER 65 HAVE YOU EVER HAD A PNA VACCINE:  [ ] Yes    [ ] No       [ X] N/A      REVIEW OF SYSTEMS:  [ ]chest pain  [  ]shortness of breath  [  ]palpitations  [  ]syncope  [ ]near syncope [ ]upper extremity weakness   [ ] lower extremity weakness  [  ]diplopia  [  ]altered mental status   [  ]fevers  [ ]chills [ ]nausea  [ ]vomitting  [  ]dysphagia    [ ]abdominal pain  [ ]melena  [ ]BRBPR    [  ]epistaxis  [  ]rash    [ ]lower extremity edema        [ ] All others negative	  [ ] Unable to obtain      LABS:	 	    CARDIAC MARKERS:                        13.1   8.48  )-----------( 260      ( 26 May 2020 05:24 )             39.5     Hb Trend: 13.1<--, 12.9<--    05-26    139  |  102  |  11  ----------------------------<  80  3.7   |  24  |  0.70    Ca    9.5      26 May 2020 05:24  Phos  3.9     05-26  Mg     2.2     05-26    TPro  6.7  /  Alb  4.4  /  TBili  0.4  /  DBili  x   /  AST  12  /  ALT  13  /  AlkPhos  34<L>  05-26    Creatinine Trend: 0.70<--, 0.78<--, 0.61<--, 0.73<--    Coags:  PT/INR - ( 25 May 2020 18:28 )   PT: 11.8 SEC;   INR: 1.03          PTT - ( 25 May 2020 18:28 )  PTT:27.6 SEC    proBNP:   Lipid Profile:   HgA1c:   TSH:       PHYSICAL EXAM:  T(C): 36.6 (05-26-20 @ 06:45), Max: 36.9 (05-25-20 @ 20:25)  HR: 59 (05-26-20 @ 06:45) (59 - 85)  BP: 97/63 (05-26-20 @ 06:45) (97/62 - 116/70)  RR: 18 (05-26-20 @ 06:45) (18 - 20)  SpO2: 100% (05-26-20 @ 06:45) (96% - 100%)  Wt(kg): --   BMI (kg/m2): 15.6 (05-25-20 @ 21:38)  I&O's Summary      Gen: Appears well in NAD  HEENT:  (-)icterus (-)pallor  CV: N S1 S2 1/6 KELLY (+)2 Pulses B/l  Resp:  Clear to auscultation B/L, normal effort  GI: (+) BS Soft, NT, ND  Lymph:  (-)Edema, (-)obvious lymphadenopathy  Skin: Warm to touch, Normal turgor  Psych: Appropriate mood and affect      TELEMETRY: 	  Not on telemetry     ECG:  	    < from: 12 Lead ECG (05.15.20 @ 12:34) >  Sinus bradycardia  Nonspecific T wave abnormality  Abnormal ECG    < end of copied text >  RADIOLOGY:         CXR:     < from: Transthoracic Echocardiogram (05.14.20 @ 11:12) >  CONCLUSIONS:  1. Normal mitral valve. Minimal mitral regurgitation.  2. Normal left ventricular internal dimensions and wall  thicknesses.  3. Normal left ventricular systolic function. No segmental  wall motion abnormalities.  4. Normal left ventricular diastolic function.  5. Normal right ventricular size and function.  ------------------------------------------------------------------------  Confirmed on  5/14/2020 - 13:58:58 by Bryn Beltrán M.D.    < end of copied text >      ASSESSMENT/PLAN: 	    29 year old female with no PMH presents to ER with c/o melena. She was diagnosed with pericarditis at an urgent care center, was seen recently at [facility] for work up and dx with pericarditis.   Admitted 5/14-5/15 for pericarditis, discharged w/ colchicine, Ibuprofen and protonix and d/c with outpatient f/u in our office. Patient reports pericarditis symptoms have improved, followed up with Cardiology outpatient on 5/19, had TTE and EKG performed, results pending. Epigastric pain and melena developed 3 days ago. Reports 1 episode of melena per day, last episode this yesterday. Cardiology following for management of recent dx of  pericarditis.       -- pt currently chest pain free   -- recent echo with normal lv sys function, no seg wma   -- h/h stable, monitor for bleeding   -- f/u GI reccs, no need for EGD per medicine attending as discussed with GI   -- continue tx for pericarditis with colchicine   -- would hold off high dose nsaids with recent episodes of melena   -- pt to follow in our office on 6/30/20 at 12:00 with Dr. Delcid

## 2020-05-27 ENCOUNTER — TRANSCRIPTION ENCOUNTER (OUTPATIENT)
Age: 30
End: 2020-05-27

## 2020-05-27 VITALS
DIASTOLIC BLOOD PRESSURE: 58 MMHG | SYSTOLIC BLOOD PRESSURE: 94 MMHG | TEMPERATURE: 97 F | HEART RATE: 68 BPM | RESPIRATION RATE: 18 BRPM | OXYGEN SATURATION: 100 %

## 2020-05-27 DIAGNOSIS — R63.4 ABNORMAL WEIGHT LOSS: ICD-10-CM

## 2020-05-27 LAB
ALBUMIN SERPL ELPH-MCNC: 4.4 G/DL — SIGNIFICANT CHANGE UP (ref 3.3–5)
ALP SERPL-CCNC: 41 U/L — SIGNIFICANT CHANGE UP (ref 40–120)
ALT FLD-CCNC: 10 U/L — SIGNIFICANT CHANGE UP (ref 4–33)
ANION GAP SERPL CALC-SCNC: 11 MMO/L — SIGNIFICANT CHANGE UP (ref 7–14)
AST SERPL-CCNC: 14 U/L — SIGNIFICANT CHANGE UP (ref 4–32)
BASOPHILS # BLD AUTO: 0.02 K/UL — SIGNIFICANT CHANGE UP (ref 0–0.2)
BASOPHILS NFR BLD AUTO: 0.3 % — SIGNIFICANT CHANGE UP (ref 0–2)
BILIRUB SERPL-MCNC: 0.2 MG/DL — SIGNIFICANT CHANGE UP (ref 0.2–1.2)
BUN SERPL-MCNC: 15 MG/DL — SIGNIFICANT CHANGE UP (ref 7–23)
CALCIUM SERPL-MCNC: 9.1 MG/DL — SIGNIFICANT CHANGE UP (ref 8.4–10.5)
CHLORIDE SERPL-SCNC: 104 MMOL/L — SIGNIFICANT CHANGE UP (ref 98–107)
CO2 SERPL-SCNC: 23 MMOL/L — SIGNIFICANT CHANGE UP (ref 22–31)
CREAT SERPL-MCNC: 0.72 MG/DL — SIGNIFICANT CHANGE UP (ref 0.5–1.3)
EOSINOPHIL # BLD AUTO: 0.18 K/UL — SIGNIFICANT CHANGE UP (ref 0–0.5)
EOSINOPHIL NFR BLD AUTO: 2.7 % — SIGNIFICANT CHANGE UP (ref 0–6)
GLUCOSE SERPL-MCNC: 95 MG/DL — SIGNIFICANT CHANGE UP (ref 70–99)
HCT VFR BLD CALC: 39 % — SIGNIFICANT CHANGE UP (ref 34.5–45)
HGB BLD-MCNC: 13 G/DL — SIGNIFICANT CHANGE UP (ref 11.5–15.5)
IMM GRANULOCYTES NFR BLD AUTO: 0.3 % — SIGNIFICANT CHANGE UP (ref 0–1.5)
LYMPHOCYTES # BLD AUTO: 2.23 K/UL — SIGNIFICANT CHANGE UP (ref 1–3.3)
LYMPHOCYTES # BLD AUTO: 34 % — SIGNIFICANT CHANGE UP (ref 13–44)
MAGNESIUM SERPL-MCNC: 2.3 MG/DL — SIGNIFICANT CHANGE UP (ref 1.6–2.6)
MCHC RBC-ENTMCNC: 30.6 PG — SIGNIFICANT CHANGE UP (ref 27–34)
MCHC RBC-ENTMCNC: 33.3 % — SIGNIFICANT CHANGE UP (ref 32–36)
MCV RBC AUTO: 91.8 FL — SIGNIFICANT CHANGE UP (ref 80–100)
MONOCYTES # BLD AUTO: 0.61 K/UL — SIGNIFICANT CHANGE UP (ref 0–0.9)
MONOCYTES NFR BLD AUTO: 9.3 % — SIGNIFICANT CHANGE UP (ref 2–14)
NEUTROPHILS # BLD AUTO: 3.49 K/UL — SIGNIFICANT CHANGE UP (ref 1.8–7.4)
NEUTROPHILS NFR BLD AUTO: 53.4 % — SIGNIFICANT CHANGE UP (ref 43–77)
NRBC # FLD: 0 K/UL — SIGNIFICANT CHANGE UP (ref 0–0)
PHOSPHATE SERPL-MCNC: 3.4 MG/DL — SIGNIFICANT CHANGE UP (ref 2.5–4.5)
PLATELET # BLD AUTO: 271 K/UL — SIGNIFICANT CHANGE UP (ref 150–400)
PMV BLD: 10.8 FL — SIGNIFICANT CHANGE UP (ref 7–13)
POTASSIUM SERPL-MCNC: 4.1 MMOL/L — SIGNIFICANT CHANGE UP (ref 3.5–5.3)
POTASSIUM SERPL-SCNC: 4.1 MMOL/L — SIGNIFICANT CHANGE UP (ref 3.5–5.3)
PROT SERPL-MCNC: 6.9 G/DL — SIGNIFICANT CHANGE UP (ref 6–8.3)
RBC # BLD: 4.25 M/UL — SIGNIFICANT CHANGE UP (ref 3.8–5.2)
RBC # FLD: 11.9 % — SIGNIFICANT CHANGE UP (ref 10.3–14.5)
SODIUM SERPL-SCNC: 138 MMOL/L — SIGNIFICANT CHANGE UP (ref 135–145)
WBC # BLD: 6.55 K/UL — SIGNIFICANT CHANGE UP (ref 3.8–10.5)
WBC # FLD AUTO: 6.55 K/UL — SIGNIFICANT CHANGE UP (ref 3.8–10.5)

## 2020-05-27 RX ORDER — PANTOPRAZOLE SODIUM 20 MG/1
1 TABLET, DELAYED RELEASE ORAL
Qty: 60 | Refills: 0
Start: 2020-05-27 | End: 2020-06-25

## 2020-05-27 RX ORDER — COLCHICINE 0.6 MG
1 TABLET ORAL
Qty: 60 | Refills: 0
Start: 2020-05-27 | End: 2020-06-25

## 2020-05-27 RX ORDER — SUCRALFATE 1 G
1 TABLET ORAL
Qty: 60 | Refills: 0
Start: 2020-05-27 | End: 2020-06-25

## 2020-05-27 RX ADMIN — Medication 0.6 MILLIGRAM(S): at 05:43

## 2020-05-27 RX ADMIN — PANTOPRAZOLE SODIUM 40 MILLIGRAM(S): 20 TABLET, DELAYED RELEASE ORAL at 05:43

## 2020-05-27 RX ADMIN — Medication 1 GRAM(S): at 05:43

## 2020-05-27 NOTE — DISCHARGE NOTE PROVIDER - NSDCFUADDAPPT_GEN_ALL_CORE_FT
Please follow up with Rheumatology [phone].  Please call to make an appointment within 1 week of discharge.

## 2020-05-27 NOTE — PROGRESS NOTE ADULT - ATTENDING COMMENTS
Patient seen and examined.  Agree with above.   Pt. pericarditis symptoms have resolved  Given above and guaic positive stool, would continue to hold NSAIDS  Continue with colchicine if no contraindications  No further inpatient cardiac workup needed at this time.   Follow up with Dr. Delcid after discharge    Bree Carranza MD
discharge home
discussed with patient in detail, expresses understanding of treatment plans.  discussed with GI attending  discussed with cardiology service

## 2020-05-27 NOTE — PROGRESS NOTE ADULT - SUBJECTIVE AND OBJECTIVE BOX
Patient denies chest pain or shortness of breath.   Review of systems otherwise (-)  	  MEDICATIONS:  MEDICATIONS  (STANDING):  colchicine 0.6 milliGRAM(s) Oral two times a day  pantoprazole  Injectable 40 milliGRAM(s) IV Push two times a day  sucralfate 1 Gram(s) Oral two times a day      LABS:	 	    CARDIAC MARKERS:                                13.0   6.55  )-----------( 271      ( 27 May 2020 06:25 )             39.0     Hemoglobin: 13.0 g/dL (05-27 @ 06:25)  Hemoglobin: 13.5 g/dL (05-26 @ 14:00)  Hemoglobin: 13.1 g/dL (05-26 @ 05:24)  Hemoglobin: 12.9 g/dL (05-25 @ 18:28)      05-27    138  |  104  |  15  ----------------------------<  95  4.1   |  23  |  0.72    Ca    9.1      27 May 2020 06:25  Phos  3.4     05-27  Mg     2.3     05-27    TPro  6.9  /  Alb  4.4  /  TBili  0.2  /  DBili  x   /  AST  14  /  ALT  10  /  AlkPhos  41  05-27    Creatinine Trend: 0.72<--, 0.70<--, 0.78<--, 0.61<--, 0.73<--    COAGS:       proBNP:   Lipid Profile:   HgA1c:   TSH:       PHYSICAL EXAM:  T(C): 36.2 (05-27-20 @ 05:40), Max: 36.3 (05-26-20 @ 21:36)  HR: 70 (05-27-20 @ 05:40) (66 - 70)  BP: 103/51 (05-27-20 @ 05:40) (97/69 - 106/60)  RR: 18 (05-27-20 @ 05:40) (18 - 18)  SpO2: 99% (05-27-20 @ 05:40) (99% - 100%)  Wt(kg): --  I&O's Summary        Gen: Appears well in NAD  HEENT:  (-)icterus (-)pallor  CV: N S1 S2 1/6 KELLY (+)2 Pulses B/l  Resp:  Clear to ausculatation B/L, normal effort  GI: (+) BS Soft, NT, ND  Lymph:  (-)Edema, (-)obvious lymphadenopathy  Skin: Warm to touch, Normal turgor  Psych: Appropriate mood and affect      TELEMETRY: 	  NSR     ASSESSMENT/PLAN: 	    < from: Transthoracic Echocardiogram (05.14.20 @ 11:12) >  CONCLUSIONS:  1. Normal mitral valve. Minimal mitral regurgitation.  2. Normal left ventricular internal dimensions and wall  thicknesses.  3. Normal left ventricular systolic function. No segmental  wall motion abnormalities.  4. Normal left ventricular diastolic function.  5. Normal right ventricular size and function.  ------------------------------------------------------------------------  Confirmed on  5/14/2020 - 13:58:58 by Bryn Beltrán M.D.    < end of copied text >      ASSESSMENT/PLAN: 	    29 year old female with no PMH presents to ER with c/o melena. She was diagnosed with pericarditis at an urgent care center, was seen recently at [facility] for work up and dx with pericarditis.   Admitted 5/14-5/15 for pericarditis, discharged w/ colchicine, Ibuprofen and protonix and d/c with outpatient f/u in our office. Patient reports pericarditis symptoms have improved, followed up with Cardiology outpatient on 5/19, had TTE and EKG performed, results pending. Epigastric pain and melena developed 3 days ago. Reports 1 episode of melena per day, last episode this yesterday. Cardiology following for management of recent dx of  pericarditis.       -- pt currently chest pain free   -- recent echo with normal lv sys function, no seg wma   -- continue tx for pericarditis with colchicine   -- would hold off high dose nsaids with recent episodes of melena   -- HD stable, GI f/u appreciated, out pt f/u with Dr. Recinos in 2 weeks [phone]  -- pt to follow in our office on 6/30/20 at 12:00 with Dr. Delcid

## 2020-05-27 NOTE — PROGRESS NOTE ADULT - SUBJECTIVE AND OBJECTIVE BOX
Patient is a 29y old  Female who presents with a chief complaint of melena (27 May 2020 12:01)      SUBJECTIVE / OVERNIGHT EVENTS: overnight events noted    ROS:  Resp: No cough no sputum production  CVS: No chest pain no palpitations no orthopnea  GI: no N/V/D  : no dysuria, no hematuria  Neuro: no weakness no paresthesias  Heme: No petechiae no easy bruising  Msk: No joint pain no swelling  Skin: No rash no itching        MEDICATIONS  (STANDING):  colchicine 0.6 milliGRAM(s) Oral two times a day  pantoprazole  Injectable 40 milliGRAM(s) IV Push two times a day  sucralfate 1 Gram(s) Oral two times a day    MEDICATIONS  (PRN):  aluminum hydroxide/magnesium hydroxide/simethicone Suspension 30 milliLiter(s) Oral every 6 hours PRN Dyspepsia  artificial tears (preservative free) Ophthalmic Solution 1 Drop(s) Both EYES three times a day PRN Dry Eyes        CAPILLARY BLOOD GLUCOSE        I&O's Summary      Vital Signs Last 24 Hrs  T(C): 36.2 (27 May 2020 05:40), Max: 36.3 (26 May 2020 21:36)  T(F): 97.2 (27 May 2020 05:40), Max: 97.4 (26 May 2020 21:36)  HR: 70 (27 May 2020 05:40) (66 - 70)  BP: 103/51 (27 May 2020 05:40) (97/69 - 106/60)  BP(mean): --  RR: 18 (27 May 2020 05:40) (18 - 18)  SpO2: 99% (27 May 2020 05:40) (99% - 100%)    PHYSICAL EXAM: chaperone RN at bedside   GENERAL: in no apparent distress  NECK: Supple, No JVD  CHEST/LUNG: no wheeze, clear   HEART: S1 S2; No rubs  ABDOMEN: Soft, Nontender, Bowel sounds present  EXTREMITIES:   no edema  PSYCH: AO x 3 appropriate affect  NEUROLOGY: non-focal,   SKIN: No rashes or lesions    LABS:                        13.0   6.55  )-----------( 271      ( 27 May 2020 06:25 )             39.0     05-27    138  |  104  |  15  ----------------------------<  95  4.1   |  23  |  0.72    Ca    9.1      27 May 2020 06:25  Phos  3.4     05-27  Mg     2.3     05-27    TPro  6.9  /  Alb  4.4  /  TBili  0.2  /  DBili  x   /  AST  14  /  ALT  10  /  AlkPhos  41  05-27    PT/INR - ( 25 May 2020 18:28 )   PT: 11.8 SEC;   INR: 1.03          PTT - ( 25 May 2020 18:28 )  PTT:27.6 SEC            All consultant(s) notes reviewed and care discussed with other providers        Contact Number, Dr Bassett [phone]

## 2020-05-27 NOTE — DISCHARGE NOTE NURSING/CASE MANAGEMENT/SOCIAL WORK - PATIENT PORTAL LINK FT
You can access the FollowMyHealth Patient Portal offered by [facility] by registering at the following website: [email or web address]. By joining [employer]’s FollowMyHealth portal, you will also be able to view your health information using other applications (apps) compatible with our system.

## 2020-05-27 NOTE — DISCHARGE NOTE PROVIDER - HOSPITAL COURSE
29 F PMH recent pericarditis p/w abdominal pain, melena            Melena    -GI evaluation     -likely upper GI bleed from gastritis secondary to NSAIDs    -cont to hold NSAIDS     -guaiac positive stool not unexpected    -change to protonix 40mg PO BID on discharge     carafate 1g PO BID     -hemoglobin stable    -given stability in h/h and active cardiac issues will defer egd and treat medically for now    -will continue PPI IV    -advance diet to regular    -continue to hold NSAIDs    -out patient follow up  in our office in 2 weeks; [phone]        Chronic idiopathic pericarditis    -continue to hold hold ibuprofen, c/w colchicine    -will d/w cardiology attending if NSAIDs need to be resumed definitively    -basic rheum work up negative last admission.         Unintentional weight loss     -pt concerned of a 30lb weight loss     -CT A/P No intra-abdominal or intrapelvic evidence for malignancy.    -can be done as outpt but pt is wishing for inpt             Dispo: Home 29 F PMH recent pericarditis p/w abdominal pain, melena            Melena    -GI evaluation     -likely upper GI bleed from gastritis secondary to NSAIDs    -cont to hold NSAIDS     -guaiac positive stool not unexpected    -change to protonix 40mg PO BID on discharge     carafate 1g PO BID     -hemoglobin stable    -given stability in h/h and active cardiac issues will defer egd and treat medically for now    -will continue PPI IV    -advance diet to regular    -continue to hold NSAIDs    -out patient follow up  in our office in 2 weeks; [phone]        Chronic idiopathic pericarditis    -continue to hold hold ibuprofen, c/w colchicine    -basic rheum work up negative last admission.         Unintentional weight loss     -pt concerned of a 30lb weight loss     -CT A/P No intra-abdominal or intrapelvic evidence for malignancy.    -can be done as outpt but pt is wishing for inpt         On 5/27/2020, discussed with Dr. Bassett, patient is medically cleared and optimized for discharge today. All medications were reviewed with attending, and sent to mutually agreed upon pharmacy.             Dispo: Home Patient is a 30 y/o F PMH recent pericarditis p/w abdominal pain, melena         Problem/Plan - 1:    ·  Problem: Melena.  Plan: likely upper GI bleed from gastritis secondary to NSAIDs    resolved    hemoglobin stable    GI evaluation noted    CT abd negative     will continue PPI po on discharge     continue to hold NSAIDs    continue colchicine    tolerating diet    cleared for discharge.          Problem/Plan - 2:    ·  Problem: Chronic idiopathic pericarditis, unspecified complication status.  Plan: continue colchicine on discharge.         Attending Attestation:     discharge home

## 2020-05-27 NOTE — DISCHARGE NOTE PROVIDER - PROVIDER TOKENS
PROVIDER:[TOKEN:[[id number]]],PROVIDER:[TOKEN:[[id number]]],PROVIDER:[TOKEN:[[id number]]]

## 2020-05-27 NOTE — PROGRESS NOTE ADULT - SUBJECTIVE AND OBJECTIVE BOX
INTERVAL HPI/OVERNIGHT EVENTS:    small bm this morning, dark in color but not black   no abd pain   no n/v    MEDICATIONS  (STANDING):  colchicine 0.6 milliGRAM(s) Oral two times a day  pantoprazole  Injectable 40 milliGRAM(s) IV Push two times a day  sucralfate 1 Gram(s) Oral two times a day    MEDICATIONS  (PRN):  aluminum hydroxide/magnesium hydroxide/simethicone Suspension 30 milliLiter(s) Oral every 6 hours PRN Dyspepsia  artificial tears (preservative free) Ophthalmic Solution 1 Drop(s) Both EYES three times a day PRN Dry Eyes      Allergies    No Known Drug Allergies  seasonal (Rhinorrhea; Eye Irritation)    Intolerances        Review of Systems:    General:  No wt loss, fevers, chills, night sweats, fatigue   Eyes:  Good vision, no reported pain  ENT:  No sore throat, pain, runny nose, dysphagia  CV:  No pain, palpitations, hypo/hypertension  Resp:  No dyspnea, cough, tachypnea, wheezing  GI:  No pain, No nausea, No vomiting, No diarrhea, No constipation, No weight loss, No fever, No pruritis, No rectal bleeding, No melena, No dysphagia  :  No pain, bleeding, incontinence, nocturia  Muscle:  No pain, weakness  Neuro:  No weakness, tingling, memory problems  Psych:  No fatigue, insomnia, mood problems, depression  Endocrine:  No polyuria, polydypsia, cold/heat intolerance  Heme:  No petechiae, ecchymosis, easy bruisability  Skin:  No rash, tattoos, scars, edema      Vital Signs Last 24 Hrs  T(C): 36.2 (27 May 2020 05:40), Max: 36.7 (26 May 2020 11:59)  T(F): 97.2 (27 May 2020 05:40), Max: 98.1 (26 May 2020 11:59)  HR: 70 (27 May 2020 05:40) (66 - 70)  BP: 103/51 (27 May 2020 05:40) (97/69 - 115/76)  BP(mean): --  RR: 18 (27 May 2020 05:40) (18 - 18)  SpO2: 99% (27 May 2020 05:40) (99% - 100%)    PHYSICAL EXAM:    Constitutional: NAD  HEENT: EOMI, throat clear  Neck: No LAD, supple  Respiratory: CTA and P  Cardiovascular: S1 and S2, RRR, no M  Gastrointestinal: BS+, soft, NT/ND, neg HSM,  Extremities: No peripheral edema, neg clubbing, cyanosis  Vascular: 2+ peripheral pulses  Neurological: A/O x 3, no focal deficits  Psychiatric: Normal mood, normal affect  Skin: No rashes      LABS:                        13.0   6.55  )-----------( 271      ( 27 May 2020 06:25 )             39.0     05-27    138  |  104  |  15  ----------------------------<  95  4.1   |  23  |  0.72    Ca    9.1      27 May 2020 06:25  Phos  3.4     05-27  Mg     2.3     05-27    TPro  6.9  /  Alb  4.4  /  TBili  0.2  /  DBili  x   /  AST  14  /  ALT  10  /  AlkPhos  41  05-27    PT/INR - ( 25 May 2020 18:28 )   PT: 11.8 SEC;   INR: 1.03          PTT - ( 25 May 2020 18:28 )  PTT:27.6 SEC      RADIOLOGY & ADDITIONAL TESTS:    < from: CT Abdomen and Pelvis w/ Oral Cont and w/ IV Cont (05.26.20 @ 18:00) >    EXAM:  CT ABDOMEN AND PELVIS OC IC        PROCEDURE DATE:  May 26 2020         INTERPRETATION:  CLINICAL INFORMATION: 30 lb unintentional weight loss. Family history of GI malignancy.    COMPARISON: None.    PROCEDURE:   CT of the Abdomen and Pelvis was performed with intravenous contrast.   Intravenous contrast: 90 ml Omnipaque 350. 10 ml discarded.  Oral contrast: positive contrast was administered.  Sagittal and coronal reformats were performed.    FINDINGS:  LOWER CHEST: Clear lungs. The heart size is normal.    LIVER: Within normal limits.  BILE DUCTS: Normal caliber.  GALLBLADDER: Within normal limits.  SPLEEN: Within normal limits.  PANCREAS: Within normal limits.  ADRENALS: Within normal limits.  KIDNEYS/URETERS: Within normal limits.    BLADDER: Within normal limits.  REPRODUCTIVE ORGANS: Within normal limits. Right corpus luteum.    BOWEL: No bowel obstruction. Appendix is not seen.  PERITONEUM: No ascites.  VESSELS: Within normal limits.  RETROPERITONEUM/LYMPH NODES: No lymphadenopathy.    ABDOMINAL WALL: Within normal limits.  BONES: Within normal limits.    IMPRESSION:   No intra-abdominal or intrapelvic evidence for malignancy.                    STEPHANIE ALFARO M.D., RADIOLOGY RESIDENT  This document has been electronically signed.  JENN ALLEN M.D., ATTENDING RADIOLOGIST  This document has been electronically signed. May 27 2020  8:35AM                  < end of copied text >

## 2020-05-27 NOTE — DISCHARGE NOTE NURSING/CASE MANAGEMENT/SOCIAL WORK - NSDCFUADDAPPT_GEN_ALL_CORE_FT
Please follow up with Rheumatology [phone].  Please call to make an appointment within 1 week of discharge.

## 2020-05-27 NOTE — DISCHARGE NOTE PROVIDER - NSDCMRMEDTOKEN_GEN_ALL_CORE_FT
colchicine 0.6 mg oral tablet: 1 tab(s) orally 2 times a day  ibuprofen 600 mg oral tablet: 1 tab(s) orally every 6 hours  pantoprazole 40 mg oral delayed release tablet: 1 tab(s) orally once a day (before a meal) aluminum hydroxide-magnesium hydroxide 200 mg-200 mg/5 mL oral suspension: 30 milliliter(s) orally every 6 hours, As needed, Dyspepsia  colchicine 0.6 mg oral tablet: 1 tab(s) orally 2 times a day  pantoprazole 40 mg oral delayed release tablet: 1 tab(s) orally 2 times a day   sucralfate 1 g oral tablet: 1 tab(s) orally 2 times a day

## 2020-05-27 NOTE — CHART NOTE - NSCHARTNOTEFT_GEN_A_CORE
On 5/27/2020, discussed with Dr bassett _, patient is medically cleared and optimized for discharge today. All medications, labs, and imaging were reviewed with attending, and sent to mutually agreed upon pharmacy. No need for CT chest as per medical attending Dr. Bassett.

## 2020-05-27 NOTE — DISCHARGE NOTE PROVIDER - CARE PROVIDERS DIRECT ADDRESSES
[email or web address],DirectAddress_Unknown,DirectAddress_Unknown

## 2020-05-27 NOTE — PROGRESS NOTE ADULT - PROBLEM SELECTOR PLAN 1
likely upper GI bleed from gastritis secondary to NSAIDs  resolved  hemoglobin stable  GI evaluation noted  CT abd negative   will continue PPI po on discharge   continue to hold NSAIDs  continue colchicine  tolerating diet  cleared for discharge

## 2020-05-27 NOTE — DISCHARGE NOTE PROVIDER - NSDCCPCAREPLAN_GEN_ALL_CORE_FT
PRINCIPAL DISCHARGE DIAGNOSIS  Diagnosis: GI bleed  Assessment and Plan of Treatment: Please follow up with your pcp within 1 week of discharge.  Please call to make an appointment within 1 week of discharge.  You were seen by the gastroeneterologist while you were admitted to the hospital -GI evaluation   -You most likley have an upper Gastrointestinal bleed from gastritis secondary to Nonsteroidal anti inflammatory medications such as ibuprofen (advil, aleve).  Please conitnue to hold  and avoid these medications.    -change to protonix 40mg PO BID and carafate 1g PO BID on discharge   -You complete blood count is within normal limits on day of discharge your hemoglobin 13.0/39.0.    -Continue medical treatment as per gastroenterolgy; no need for an endoscopy in patient .   -continue regular diet   -out patient follow up  in our office in 2 weeks; [phone]        SECONDARY DISCHARGE DIAGNOSES  Diagnosis: Chronic idiopathic pericarditis, unspecified complication status  Assessment and Plan of Treatment: Please follow up with your pcp within 1 week of discharge.  Please call to make an appointment within 1 week of discharge.   -continue to hold hold ibuprofen, continue with colchicine.    Please follow up with Cardiology as an outpt.   -- pt to follow in our office on 6/30/20 at 12:00 with Dr. Delcid    Diagnosis: Unexplained weight loss  Assessment and Plan of Treatment: Please follow up with your pcp within 1 week of discharge.  Please call to make an appointment within 1 week of discharge.  You had a ct of your abdomen and pelvis  -pt concerned of a 30lb weight loss   -Catscan of abdomen and pelvis:No intra-abdominal or intrapelvic evidence for malignancy.

## 2020-05-27 NOTE — PROGRESS NOTE ADULT - ASSESSMENT
30 y/o F PMH recent pericarditis p/w abdominal pain, melena X3 days.    Melena   2/2 NSAID use, likely PUD   cont to hold NSAIDS   trend h/h; stable   Protonix 40mg PO BID on discharge   carafate 1g PO BID   given stability in h/h and active cardiac issues will defer egd and treat medically for now  diet as tolerated   outpt fu in our office in 2 weeks; [phone]  no gi objection to dc plans per primary team     Unintentional weight loss   pt concerned of a 30lb weight loss   CT A/P without acute pathology   no gi objection to dc plans per primary team     Pericarditis   continue management per cardiology     Advanced care planning was discussed with patient and family.  Advanced care planning forms were reviewed and discussed.  Risks, benefits and alternatives of gastroenterologic procedures were discussed in detail and all questions were answered.  30 minutes spent.

## 2020-05-27 NOTE — DISCHARGE NOTE PROVIDER - CARE PROVIDER_API CALL
Sal Clemens  GASTROENTEROLOGY  [address] [place] [address]  Phone: [phone]  Fax: [phone]  Follow Up Time:     Salud Avery  CARDIOVASCULAR DISEASE  [address] [facility] [facility] [address]  [place] [address]  Phone: [phone]  Fax: [phone]  Follow Up Time:     Piyush Amanda [facility]  [address] [place] [address]  Phone: [phone]  Fax: [phone]  Follow Up Time:

## 2021-09-15 ENCOUNTER — APPOINTMENT (OUTPATIENT)
Dept: RADIOLOGY | Facility: IMAGING CENTER | Age: 31
End: 2021-09-15
Payer: COMMERCIAL

## 2021-09-15 ENCOUNTER — APPOINTMENT (OUTPATIENT)
Dept: ULTRASOUND IMAGING | Facility: IMAGING CENTER | Age: 31
End: 2021-09-15
Payer: COMMERCIAL

## 2021-09-15 ENCOUNTER — OUTPATIENT (OUTPATIENT)
Dept: OUTPATIENT SERVICES | Facility: HOSPITAL | Age: 31
LOS: 1 days | End: 2021-09-15
Payer: COMMERCIAL

## 2021-09-15 DIAGNOSIS — Z00.8 ENCOUNTER FOR OTHER GENERAL EXAMINATION: ICD-10-CM

## 2021-09-15 PROBLEM — I31.9 DISEASE OF PERICARDIUM, UNSPECIFIED: Chronic | Status: ACTIVE | Noted: 2020-05-25

## 2021-09-15 PROCEDURE — 71046 X-RAY EXAM CHEST 2 VIEWS: CPT

## 2021-09-15 PROCEDURE — 93971 EXTREMITY STUDY: CPT

## 2021-09-15 PROCEDURE — 93971 EXTREMITY STUDY: CPT | Mod: 26,LT

## 2021-09-15 PROCEDURE — 71046 X-RAY EXAM CHEST 2 VIEWS: CPT | Mod: 26

## 2021-09-20 ENCOUNTER — APPOINTMENT (OUTPATIENT)
Dept: ULTRASOUND IMAGING | Facility: CLINIC | Age: 31
End: 2021-09-20
Payer: COMMERCIAL

## 2021-09-20 ENCOUNTER — OUTPATIENT (OUTPATIENT)
Dept: OUTPATIENT SERVICES | Facility: HOSPITAL | Age: 31
LOS: 1 days | End: 2021-09-20
Payer: COMMERCIAL

## 2021-09-20 DIAGNOSIS — Z00.8 ENCOUNTER FOR OTHER GENERAL EXAMINATION: ICD-10-CM

## 2021-09-20 PROCEDURE — 76700 US EXAM ABDOM COMPLETE: CPT | Mod: 26

## 2021-09-20 PROCEDURE — 76700 US EXAM ABDOM COMPLETE: CPT

## 2022-02-05 ENCOUNTER — EMERGENCY (EMERGENCY)
Facility: HOSPITAL | Age: 32
LOS: 1 days | Discharge: HOME | End: 2022-02-05
Attending: EMERGENCY MEDICINE | Admitting: EMERGENCY MEDICINE
Payer: COMMERCIAL

## 2022-02-05 VITALS
OXYGEN SATURATION: 100 % | SYSTOLIC BLOOD PRESSURE: 116 MMHG | TEMPERATURE: 98 F | HEIGHT: 62 IN | HEART RATE: 74 BPM | RESPIRATION RATE: 17 BRPM | DIASTOLIC BLOOD PRESSURE: 82 MMHG

## 2022-02-05 VITALS
RESPIRATION RATE: 18 BRPM | OXYGEN SATURATION: 100 % | HEART RATE: 74 BPM | SYSTOLIC BLOOD PRESSURE: 100 MMHG | TEMPERATURE: 98 F | DIASTOLIC BLOOD PRESSURE: 61 MMHG

## 2022-02-05 LAB
ALBUMIN SERPL ELPH-MCNC: 4.9 G/DL — SIGNIFICANT CHANGE UP (ref 3.3–5)
ALP SERPL-CCNC: 45 U/L — SIGNIFICANT CHANGE UP (ref 40–120)
ALT FLD-CCNC: 10 U/L — SIGNIFICANT CHANGE UP (ref 4–33)
ANION GAP SERPL CALC-SCNC: 11 MMOL/L — SIGNIFICANT CHANGE UP (ref 7–14)
APPEARANCE UR: CLEAR — SIGNIFICANT CHANGE UP
AST SERPL-CCNC: 19 U/L — SIGNIFICANT CHANGE UP (ref 4–32)
BASOPHILS # BLD AUTO: 0.02 K/UL — SIGNIFICANT CHANGE UP (ref 0–0.2)
BASOPHILS NFR BLD AUTO: 0.2 % — SIGNIFICANT CHANGE UP (ref 0–2)
BILIRUB SERPL-MCNC: <0.2 MG/DL — SIGNIFICANT CHANGE UP (ref 0.2–1.2)
BILIRUB UR-MCNC: NEGATIVE — SIGNIFICANT CHANGE UP
BUN SERPL-MCNC: 7 MG/DL — SIGNIFICANT CHANGE UP (ref 7–23)
CALCIUM SERPL-MCNC: 9.6 MG/DL — SIGNIFICANT CHANGE UP (ref 8.4–10.5)
CHLORIDE SERPL-SCNC: 103 MMOL/L — SIGNIFICANT CHANGE UP (ref 98–107)
CO2 SERPL-SCNC: 23 MMOL/L — SIGNIFICANT CHANGE UP (ref 22–31)
COLOR SPEC: SIGNIFICANT CHANGE UP
CREAT SERPL-MCNC: 0.65 MG/DL — SIGNIFICANT CHANGE UP (ref 0.5–1.3)
D DIMER BLD IA.RAPID-MCNC: <150 NG/ML DDU — SIGNIFICANT CHANGE UP
DIFF PNL FLD: NEGATIVE — SIGNIFICANT CHANGE UP
EOSINOPHIL # BLD AUTO: 0.16 K/UL — SIGNIFICANT CHANGE UP (ref 0–0.5)
EOSINOPHIL NFR BLD AUTO: 1.9 % — SIGNIFICANT CHANGE UP (ref 0–6)
GLUCOSE SERPL-MCNC: 97 MG/DL — SIGNIFICANT CHANGE UP (ref 70–99)
GLUCOSE UR QL: NEGATIVE — SIGNIFICANT CHANGE UP
HCT VFR BLD CALC: 39.3 % — SIGNIFICANT CHANGE UP (ref 34.5–45)
HGB BLD-MCNC: 13 G/DL — SIGNIFICANT CHANGE UP (ref 11.5–15.5)
IANC: 4.78 K/UL — SIGNIFICANT CHANGE UP (ref 1.5–8.5)
IMM GRANULOCYTES NFR BLD AUTO: 0.2 % — SIGNIFICANT CHANGE UP (ref 0–1.5)
KETONES UR-MCNC: NEGATIVE — SIGNIFICANT CHANGE UP
LEUKOCYTE ESTERASE UR-ACNC: NEGATIVE — SIGNIFICANT CHANGE UP
LYMPHOCYTES # BLD AUTO: 3.14 K/UL — SIGNIFICANT CHANGE UP (ref 1–3.3)
LYMPHOCYTES # BLD AUTO: 36.5 % — SIGNIFICANT CHANGE UP (ref 13–44)
MCHC RBC-ENTMCNC: 30.4 PG — SIGNIFICANT CHANGE UP (ref 27–34)
MCHC RBC-ENTMCNC: 33.1 GM/DL — SIGNIFICANT CHANGE UP (ref 32–36)
MCV RBC AUTO: 91.8 FL — SIGNIFICANT CHANGE UP (ref 80–100)
MONOCYTES # BLD AUTO: 0.49 K/UL — SIGNIFICANT CHANGE UP (ref 0–0.9)
MONOCYTES NFR BLD AUTO: 5.7 % — SIGNIFICANT CHANGE UP (ref 2–14)
NEUTROPHILS # BLD AUTO: 4.78 K/UL — SIGNIFICANT CHANGE UP (ref 1.8–7.4)
NEUTROPHILS NFR BLD AUTO: 55.5 % — SIGNIFICANT CHANGE UP (ref 43–77)
NITRITE UR-MCNC: NEGATIVE — SIGNIFICANT CHANGE UP
NRBC # BLD: 0 /100 WBCS — SIGNIFICANT CHANGE UP
NRBC # FLD: 0 K/UL — SIGNIFICANT CHANGE UP
PH UR: 7.5 — SIGNIFICANT CHANGE UP (ref 5–8)
PLATELET # BLD AUTO: 290 K/UL — SIGNIFICANT CHANGE UP (ref 150–400)
POTASSIUM SERPL-MCNC: 4.5 MMOL/L — SIGNIFICANT CHANGE UP (ref 3.5–5.3)
POTASSIUM SERPL-SCNC: 4.5 MMOL/L — SIGNIFICANT CHANGE UP (ref 3.5–5.3)
PROT SERPL-MCNC: 7 G/DL — SIGNIFICANT CHANGE UP (ref 6–8.3)
PROT UR-MCNC: NEGATIVE — SIGNIFICANT CHANGE UP
RBC # BLD: 4.28 M/UL — SIGNIFICANT CHANGE UP (ref 3.8–5.2)
RBC # FLD: 12.1 % — SIGNIFICANT CHANGE UP (ref 10.3–14.5)
SODIUM SERPL-SCNC: 137 MMOL/L — SIGNIFICANT CHANGE UP (ref 135–145)
SP GR SPEC: 1.01 — SIGNIFICANT CHANGE UP (ref 1–1.05)
TROPONIN T, HIGH SENSITIVITY RESULT: <6 NG/L — SIGNIFICANT CHANGE UP
UROBILINOGEN FLD QL: SIGNIFICANT CHANGE UP
WBC # BLD: 8.61 K/UL — SIGNIFICANT CHANGE UP (ref 3.8–10.5)
WBC # FLD AUTO: 8.61 K/UL — SIGNIFICANT CHANGE UP (ref 3.8–10.5)

## 2022-02-05 PROCEDURE — 93010 ELECTROCARDIOGRAM REPORT: CPT

## 2022-02-05 PROCEDURE — 71046 X-RAY EXAM CHEST 2 VIEWS: CPT | Mod: 26

## 2022-02-05 PROCEDURE — 99284 EMERGENCY DEPT VISIT MOD MDM: CPT | Mod: 25

## 2022-02-05 RX ORDER — ACETAMINOPHEN 500 MG
975 TABLET ORAL ONCE
Refills: 0 | Status: COMPLETED | OUTPATIENT
Start: 2022-02-05 | End: 2022-02-05

## 2022-02-05 RX ORDER — IBUPROFEN 200 MG
400 TABLET ORAL ONCE
Refills: 0 | Status: COMPLETED | OUTPATIENT
Start: 2022-02-05 | End: 2022-02-05

## 2022-02-05 RX ORDER — FAMOTIDINE 10 MG/ML
20 INJECTION INTRAVENOUS ONCE
Refills: 0 | Status: COMPLETED | OUTPATIENT
Start: 2022-02-05 | End: 2022-02-05

## 2022-02-05 RX ADMIN — Medication 400 MILLIGRAM(S): at 17:02

## 2022-02-05 RX ADMIN — Medication 975 MILLIGRAM(S): at 17:02

## 2022-02-05 RX ADMIN — FAMOTIDINE 20 MILLIGRAM(S): 10 INJECTION INTRAVENOUS at 17:02

## 2022-02-05 NOTE — ED PROVIDER NOTE - OBJECTIVE STATEMENT
This is a 31 yr old F, pmh pericarditis ( 2020 May), herniated cervical disk (2016) with c/o sudden onset of chest pain, palpitation, dizziness, shakiness since last night. Reports feels it started on left chest wall radiation to her left lateral neck to the left sided temporal area worsen with deep inhalation. Reports increased vaginal discharge.  Reports covid + endo of december. Daily nicotine vaping. Denies any fever, chills, abd pain vomiting, no numbness, or tingling,  any changes in bladder or bowel function.

## 2022-02-05 NOTE — ED ADULT NURSE NOTE - CHIEF COMPLAINT QUOTE
pt c/o intermittent palpations, lightheadedness, headache [facility]

## 2022-02-05 NOTE — ED PROVIDER NOTE - NSFOLLOWUPINSTRUCTIONS_ED_ALL_ED_FT
PLEASE FOLLOW UP WITH YOUR CARDIOLOGIST AS YOUR EARLIEST CONNIVENCE.     Chest Pain    Chest pain can be caused by many different conditions which may or may not be dangerous. Causes include heartburn, lung infections, heart attack, blood clot in lungs, skin infections, strain or damage to muscle, cartilage, or bones, etc. In addition to a history and physical examination, an electrocardiogram (ECG) or other lab tests may have been performed to determine the cause of your chest pain. Follow up with your primary care provider or with a cardiologist as instructed.     SEEK IMMEDIATE MEDICAL CARE IF YOU HAVE ANY OF THE FOLLOWING SYMPTOMS: worsening chest pain, coughing up blood, unexplained back/neck/jaw pain, severe abdominal pain, dizziness or lightheadedness, fainting, shortness of breath, sweaty or clammy skin, vomiting, or racing heart beat. These symptoms may represent a serious problem that is an emergency. Do not wait to see if the symptoms will go away. Get medical help right away. Call 911 and do not drive yourself to the hospital.

## 2022-02-05 NOTE — ED ADULT NURSE NOTE - OBJECTIVE STATEMENT
pt A&ox4, came to ED for chest palpitations and fatigue that started yesterday. pt states chest pain is left sided and goes up left side of neck., pt denies SOB. pt denies H/A, Dizziness, lightheadedness, and radiating chest pain. breathing is spontaneous and unlabored. sating 99% on RA. bilateral pedal and radial pulses palpable and strong. right 20g IV placed in AC. Labs drawn and sent as per ordered. Bed in lowest position, call bell within reach, all other safety and comfort measures provided. awaiting labs results and further orders.

## 2022-02-05 NOTE — ED PROVIDER NOTE - ATTENDING CONTRIBUTION TO CARE
Afebrile. Awake and Alert. Lungs CTA. Heart RRR. Abdomen soft NTND. CN II-XII grossly intact. Moves all extremities without lateralization. No carotid bruit. +TTP left upper chest and left trapezius.     -Palpitations Afebrile. Awake and Alert. Lungs CTA. Heart RRR. Abdomen soft NTND. CN II-XII grossly intact. Moves all extremities without lateralization. No carotid bruit. +TTP left upper chest and left trapezius.     -Palpitations and lightheadedness yesterday for 1 hour. Pt just turned in Holter monitor this week, does not yet have results  -Non-exertional chest pain reproducible on exam, no personal ACS risk factors, last exertional activity was shoveling snow 1 week ago, EKG without ischemia, urgent care noted TWI in V1 and V2 which are present today and an acceptable physiologic EKG variation Afebrile. Awake and Alert. Lungs CTA. Heart RRR. Abdomen soft NTND. CN II-XII grossly intact. Moves all extremities without lateralization. No carotid bruit. +TTP left upper chest and left trapezius.     -Palpitations and lightheadedness yesterday for 1 hour. Pt just turned in Holter monitor this week, does not yet have results, may need longer term monitoring  -Non-exertional chest pain reproducible on exam, no personal ACS risk factors, last exertional activity was shoveling snow 1 week ago, EKG without ischemia, urgent care noted TWI in V1 and V2 which are present today and an acceptable physiologic EKG variation  -r/o Pericarditis h/o recurrent idiopathic pericarditis, HD stable  -r/o PE: no hypoxia or tachycardia, only risk factor is recent COVID infection, however, pt notes concern given pain with deep respirations  -Pt has cardiologist, recommend f/u for r/o pericarditis and r/o arrythmia

## 2022-02-05 NOTE — ED ADULT TRIAGE NOTE - CHIEF COMPLAINT QUOTE
pt c/o intermittent palpations, lightheadedness, headache and neck pain[facility]

## 2022-08-03 NOTE — CONSULT NOTE ADULT - PROBLEM SELECTOR RECOMMENDATION 3
Detail Level: Detailed
reassured at length  no overt signs at present  affect normal and pleasant
Render Risk Assessment In Note?: no
Additional Notes: We recommend that patient have a allergist evaluation

## 2022-10-05 ENCOUNTER — EMERGENCY (EMERGENCY)
Facility: HOSPITAL | Age: 32
LOS: 1 days | Discharge: HOME | End: 2022-10-05
Attending: STUDENT IN AN ORGANIZED HEALTH CARE EDUCATION/TRAINING PROGRAM | Admitting: STUDENT IN AN ORGANIZED HEALTH CARE EDUCATION/TRAINING PROGRAM

## 2022-10-05 VITALS
DIASTOLIC BLOOD PRESSURE: 64 MMHG | OXYGEN SATURATION: 100 % | SYSTOLIC BLOOD PRESSURE: 105 MMHG | TEMPERATURE: 99 F | RESPIRATION RATE: 15 BRPM | HEART RATE: 67 BPM

## 2022-10-05 VITALS
OXYGEN SATURATION: 94 % | RESPIRATION RATE: 15 BRPM | TEMPERATURE: 99 F | SYSTOLIC BLOOD PRESSURE: 120 MMHG | DIASTOLIC BLOOD PRESSURE: 83 MMHG | HEIGHT: 62 IN | HEART RATE: 74 BPM

## 2022-10-05 LAB
ALBUMIN SERPL ELPH-MCNC: 5 G/DL — SIGNIFICANT CHANGE UP (ref 3.3–5)
ALP SERPL-CCNC: 50 U/L — SIGNIFICANT CHANGE UP (ref 40–120)
ALT FLD-CCNC: 10 U/L — SIGNIFICANT CHANGE UP (ref 4–33)
ANION GAP SERPL CALC-SCNC: 10 MMOL/L — SIGNIFICANT CHANGE UP (ref 7–14)
AST SERPL-CCNC: 18 U/L — SIGNIFICANT CHANGE UP (ref 4–32)
BASOPHILS # BLD AUTO: 0.01 K/UL — SIGNIFICANT CHANGE UP (ref 0–0.2)
BASOPHILS NFR BLD AUTO: 0.1 % — SIGNIFICANT CHANGE UP (ref 0–2)
BILIRUB SERPL-MCNC: 0.3 MG/DL — SIGNIFICANT CHANGE UP (ref 0.2–1.2)
BUN SERPL-MCNC: 10 MG/DL — SIGNIFICANT CHANGE UP (ref 7–23)
CALCIUM SERPL-MCNC: 9.7 MG/DL — SIGNIFICANT CHANGE UP (ref 8.4–10.5)
CHLORIDE SERPL-SCNC: 104 MMOL/L — SIGNIFICANT CHANGE UP (ref 98–107)
CO2 SERPL-SCNC: 27 MMOL/L — SIGNIFICANT CHANGE UP (ref 22–31)
CREAT SERPL-MCNC: 0.7 MG/DL — SIGNIFICANT CHANGE UP (ref 0.5–1.3)
D DIMER BLD IA.RAPID-MCNC: <150 NG/ML DDU — SIGNIFICANT CHANGE UP
EGFR: 119 ML/MIN/1.73M2 — SIGNIFICANT CHANGE UP
EOSINOPHIL # BLD AUTO: 0.08 K/UL — SIGNIFICANT CHANGE UP (ref 0–0.5)
EOSINOPHIL NFR BLD AUTO: 1.2 % — SIGNIFICANT CHANGE UP (ref 0–6)
GLUCOSE SERPL-MCNC: 95 MG/DL — SIGNIFICANT CHANGE UP (ref 70–99)
HCT VFR BLD CALC: 42.4 % — SIGNIFICANT CHANGE UP (ref 34.5–45)
HGB BLD-MCNC: 14.1 G/DL — SIGNIFICANT CHANGE UP (ref 11.5–15.5)
IANC: 4.28 K/UL — SIGNIFICANT CHANGE UP (ref 1.8–7.4)
IMM GRANULOCYTES NFR BLD AUTO: 0.3 % — SIGNIFICANT CHANGE UP (ref 0–0.9)
LYMPHOCYTES # BLD AUTO: 1.93 K/UL — SIGNIFICANT CHANGE UP (ref 1–3.3)
LYMPHOCYTES # BLD AUTO: 28.8 % — SIGNIFICANT CHANGE UP (ref 13–44)
MAGNESIUM SERPL-MCNC: 2.2 MG/DL — SIGNIFICANT CHANGE UP (ref 1.6–2.6)
MCHC RBC-ENTMCNC: 30.2 PG — SIGNIFICANT CHANGE UP (ref 27–34)
MCHC RBC-ENTMCNC: 33.3 GM/DL — SIGNIFICANT CHANGE UP (ref 32–36)
MCV RBC AUTO: 90.8 FL — SIGNIFICANT CHANGE UP (ref 80–100)
MONOCYTES # BLD AUTO: 0.38 K/UL — SIGNIFICANT CHANGE UP (ref 0–0.9)
MONOCYTES NFR BLD AUTO: 5.7 % — SIGNIFICANT CHANGE UP (ref 2–14)
NEUTROPHILS # BLD AUTO: 4.28 K/UL — SIGNIFICANT CHANGE UP (ref 1.8–7.4)
NEUTROPHILS NFR BLD AUTO: 63.9 % — SIGNIFICANT CHANGE UP (ref 43–77)
NRBC # BLD: 0 /100 WBCS — SIGNIFICANT CHANGE UP (ref 0–0)
NRBC # FLD: 0 K/UL — SIGNIFICANT CHANGE UP (ref 0–0)
PLATELET # BLD AUTO: 311 K/UL — SIGNIFICANT CHANGE UP (ref 150–400)
POTASSIUM SERPL-MCNC: 4.1 MMOL/L — SIGNIFICANT CHANGE UP (ref 3.5–5.3)
POTASSIUM SERPL-SCNC: 4.1 MMOL/L — SIGNIFICANT CHANGE UP (ref 3.5–5.3)
PROT SERPL-MCNC: 7.4 G/DL — SIGNIFICANT CHANGE UP (ref 6–8.3)
RBC # BLD: 4.67 M/UL — SIGNIFICANT CHANGE UP (ref 3.8–5.2)
RBC # FLD: 11.9 % — SIGNIFICANT CHANGE UP (ref 10.3–14.5)
SODIUM SERPL-SCNC: 141 MMOL/L — SIGNIFICANT CHANGE UP (ref 135–145)
TROPONIN T, HIGH SENSITIVITY RESULT: <6 NG/L — SIGNIFICANT CHANGE UP
TSH SERPL-MCNC: 1.61 UIU/ML — SIGNIFICANT CHANGE UP (ref 0.27–4.2)
WBC # BLD: 6.7 K/UL — SIGNIFICANT CHANGE UP (ref 3.8–10.5)
WBC # FLD AUTO: 6.7 K/UL — SIGNIFICANT CHANGE UP (ref 3.8–10.5)

## 2022-10-05 PROCEDURE — 99285 EMERGENCY DEPT VISIT HI MDM: CPT

## 2022-10-05 PROCEDURE — 71046 X-RAY EXAM CHEST 2 VIEWS: CPT | Mod: 26

## 2022-10-05 PROCEDURE — 93010 ELECTROCARDIOGRAM REPORT: CPT

## 2022-10-05 RX ORDER — ACETAMINOPHEN 500 MG
650 TABLET ORAL ONCE
Refills: 0 | Status: COMPLETED | OUTPATIENT
Start: 2022-10-05 | End: 2022-10-05

## 2022-10-05 RX ADMIN — Medication 650 MILLIGRAM(S): at 11:26

## 2022-10-05 NOTE — ED PROVIDER NOTE - PROGRESS NOTE DETAILS
Maurice Jenkins DO: Patient reassessed, NAD, non-toxic appearing. results dw pt/family, questions answered. has cards/gi f/u. dc w/ strict return precautions.

## 2022-10-05 NOTE — ED PROVIDER NOTE - PATIENT PORTAL LINK FT
You can access the FollowMyHealth Patient Portal offered by [facility] by registering at the following website: [email or web address]. By joining [employer]’s FollowMyHealth portal, you will also be able to view your health information using other applications (apps) compatible with our system.

## 2022-10-05 NOTE — ED PROVIDER NOTE - NS ED ROS FT
GENERAL: No fever or chills, //             EYES: no change in vision, //             HEENT: no trouble swallowing or speaking, //             CARDIAC:  chest pain, //              PULMONARY: no cough or SOB, //             GI: no abdominal pain, no nausea or no vomiting, no diarrhea or constipation, //             : No changes in urination,  //            SKIN: no rashes,  //            NEURO: no headache,  //             MSK: No joint pain otherwise as HPI or negative. ~Maurice Jenkins, DO

## 2022-10-05 NOTE — ED ADULT TRIAGE NOTE - CHIEF COMPLAINT QUOTE
states" I am having chest pain, neck and left arm pain since Saturday". h/o pancarditis in the past and was taking NSAIDs with no relief.

## 2022-10-05 NOTE — ED PROVIDER NOTE - CLINICAL SUMMARY MEDICAL DECISION MAKING FREE TEXT BOX
Maurice Jenkins, DO: 31 yo f pmh pericarditis, pw cp. reports 3-4 days of left sided cp, radiates to left neck and arm, no inciting event, no prior hx, mildly exer, non pleur, non positional, atraumatic, no rash. unable to f/u w/ her priv cards prompting ed eval. mild nausea, and sob. denies diaphoresis, f/c, cough, congestion, ha. arrives hds, well appearing. perc negative however recent travel to [place], will obtain dimer. ekg w/o pr seg depression or widespread karly or pr seg elevation in avr. labs, imaging, reassess.

## 2022-10-05 NOTE — ED PROVIDER NOTE - PHYSICAL EXAMINATION
General: well appearing, interactive, well nourished, no apparent distress, ncat  HEENT: EOMI, PERRLA, normal mucosa, normal oropharynx, no lesions on the lips or on oral mucosa, normal external ear  Neck: supple, no lymphadenopathy, full range of motion, no nuchal rigidity, no carotid bruits  CV: RRR, normal S1 and S2 with no murmur, capillary refill less than two seconds, pp 2+   Resp: lungs CTA b/l, good aeration bilaterally, symmetric chest wall   Abd: non-distended, soft, non-tender  : no CVA tenderness  MSK: full range of motion, no cyanosis, no edema, no clubbing, no immobility  Neuro: CN II-XII grossly intact, muscle strength 5/5 in all extremities, normal gait  Skin: no rashes, skin intact

## 2022-10-05 NOTE — ED PROVIDER NOTE - ATTENDING APP SHARED VISIT CONTRIBUTION OF CARE
Maurice Jenkins DO:  patient seen and evaluated with the PA.  I was present for key portions of the History & Physical, and I agree with the Impression & Plan.

## 2022-10-05 NOTE — ED ADULT NURSE NOTE - OBJECTIVE STATEMENT
Received pt to intake 10c, A+Ox4, ambulatory. C/O chest pain x 3 days, pt states pain is dull and constant, radiating to neck and left shoulder/arm. Respirations even and unlabored, normal work of breathing, no accessory muscle use, speaking in full clear uninterrupted sentences, pt endorsing minor SOB w/ exertion. ABD is soft, non tender, non distended. Pt denies any SOB, headache, dizziness, N/V/D, fever, chills.  20G to RAC, Labs sent, Medicated as per MD, will continue to monitor.

## 2022-10-05 NOTE — ED PROVIDER NOTE - OBJECTIVE STATEMENT
33 yo f pmh pericarditis, pw cp. reports 3-4 days of left sided cp, radiates to left neck and arm, no inciting event, no prior hx, mildly exer, non pleur, non positional, atraumatic, no rash. unable to f/u w/ her priv cards prompting ed eval. mild nausea, and sob. denies diaphoresis, f/c, cough, congestion, ha.

## 2022-10-05 NOTE — ED PROVIDER NOTE - NS ED ATTENDING STATEMENT MOD
This was a shared visit with the JESS. I reviewed and verified the documentation and independently performed the documented:

## 2022-10-25 ENCOUNTER — APPOINTMENT (OUTPATIENT)
Dept: ORTHOPEDIC SURGERY | Facility: CLINIC | Age: 32
End: 2022-10-25

## 2022-11-03 ENCOUNTER — APPOINTMENT (OUTPATIENT)
Dept: GASTROENTEROLOGY | Facility: CLINIC | Age: 32
End: 2022-11-03

## 2022-11-03 VITALS
TEMPERATURE: 97.1 F | WEIGHT: 113 LBS | DIASTOLIC BLOOD PRESSURE: 80 MMHG | OXYGEN SATURATION: 97 % | HEIGHT: 62 IN | HEART RATE: 67 BPM | BODY MASS INDEX: 20.8 KG/M2 | SYSTOLIC BLOOD PRESSURE: 110 MMHG

## 2022-11-03 DIAGNOSIS — M94.0 CHONDROCOSTAL JUNCTION SYNDROME [TIETZE]: ICD-10-CM

## 2022-11-03 DIAGNOSIS — K31.84 GASTROPARESIS: ICD-10-CM

## 2022-11-03 DIAGNOSIS — I31.9 DISEASE OF PERICARDIUM, UNSPECIFIED: ICD-10-CM

## 2022-11-03 PROCEDURE — 99204 OFFICE O/P NEW MOD 45 MIN: CPT

## 2022-11-03 NOTE — ASSESSMENT
Medication pended for review. [FreeTextEntry1] : The patient is a 32-year-old female who generally enjoys good health.  From the gastrointestinal perspective the patient had a remote history of unexplained weight loss and had an exhaustive GI work-up which was negative.  Fortunately she is maintaining her weight.  Differential diagnosis would include gastroparesis although the patient does not have chronic ongoing symptoms.  I instructed the patient that if the symptoms recur she will need to go for gastric emptying study.  Patient will keep a close eye on her symptoms and report back to me.  I feel that the patient's left upper quadrant discomfort is not GI related.  The discomfort is reproducible on palpation and mildly reproducible with twisting.  The patient will keep an eye on it as to whether there is any postural trigger and may benefit from taking an analgesic to see if the discomfort is relieved.  I reviewed the patient's hospital records.  I do not feel that Carlotta requires any invasive evaluation at the present time.

## 2022-11-03 NOTE — HISTORY OF PRESENT ILLNESS
[de-identified] : The patient had an endoscopy done less than 5 years ago which was normal. [FreeTextEntry1] : The patient has had 2 colonoscopies done over the past 5 years which were apparently negative. [de-identified] : Patient states she had a capsule study less than 3 years ago which was also normal.

## 2022-11-03 NOTE — PHYSICAL EXAM
[Alert] : alert [Healthy Appearing] : healthy appearing [No Acute Distress] : no acute distress [Normal] : no respiratory distress, no accessory muscle use, normal respiratory rhythm and effort, lungs were clear to auscultation bilaterally [Bowel Sounds] : normal bowel sounds [Abdomen Tenderness] : non-tender [No Masses] : no abdominal mass palpated [Abdomen Soft] : soft [] : no hepatosplenomegaly [de-identified] : There is a valvular click. [de-identified] : There is discomfort on palpation of the left inferior rib margin with some exacerbation when the patient twists

## 2022-11-03 NOTE — CURRENT MEDS
[FreeTextEntry1] : She has been on Carafate as well as proton pump inhibitors and H2 blockers in the past.

## 2022-11-03 NOTE — REVIEW OF SYSTEMS
[Fever] : no fever [Chills] : no chills [Vomiting] : no vomiting [Constipation] : no constipation [Bleeding] : no bleeding [As Noted in HPI] : as noted in HPI [Negative] : Respiratory [FreeTextEntry5] : History of pericarditis.

## 2022-11-04 ENCOUNTER — APPOINTMENT (OUTPATIENT)
Dept: ORTHOPEDIC SURGERY | Facility: CLINIC | Age: 32
End: 2022-11-04

## 2022-11-04 DIAGNOSIS — M54.2 CERVICALGIA: ICD-10-CM

## 2023-08-14 ENCOUNTER — APPOINTMENT (OUTPATIENT)
Dept: GASTROENTEROLOGY | Facility: CLINIC | Age: 33
End: 2023-08-14
Payer: COMMERCIAL

## 2023-08-14 VITALS
SYSTOLIC BLOOD PRESSURE: 100 MMHG | DIASTOLIC BLOOD PRESSURE: 80 MMHG | TEMPERATURE: 98.2 F | HEIGHT: 62 IN | BODY MASS INDEX: 22.08 KG/M2 | WEIGHT: 120 LBS

## 2023-08-14 PROCEDURE — 99214 OFFICE O/P EST MOD 30 MIN: CPT

## 2023-08-14 RX ORDER — DOCUSATE SODIUM 100 MG/1
100 CAPSULE ORAL TWICE DAILY
Qty: 60 | Refills: 1 | Status: ACTIVE | COMMUNITY
Start: 2023-08-14

## 2023-08-14 RX ORDER — METHYLCELLULOSE 500 MG/1
500 TABLET ORAL
Qty: 30 | Refills: 0 | Status: ACTIVE | COMMUNITY
Start: 2023-08-14

## 2023-08-14 NOTE — HISTORY OF PRESENT ILLNESS
[FreeTextEntry1] : Dr. Cruz takes care of this pleasant 32-year-old female  She has had several days or more of mild right anal or rectal discomfort  She had 3 days of blood per rectum on the tissue paper and toilet water  Generally mildly constipated  She eats a very healthy plant-based diet  She takes plenty of fiber  No family history of any anal rectal cancers, colitis, Crohn's disease or other intestinal illnesses  Occasional lower abdominal cramping

## 2023-08-14 NOTE — ASSESSMENT
[FreeTextEntry1] : Impression  Rectal bleeding  Rectal anal discomfort  Small thrombosed hemorrhoid on physical exam  Suggest  We spent a long time discussing options  Given the small size and the short duration of symptoms it would seem conservative management makes most sense  Sitz bath's  High-fiber diet  Citrucel  Colace  If symptoms continue she should call or follow-up  We can always send her to a surgeon if needed  Follow-up with me or Dr. Cruz at any time

## 2023-08-14 NOTE — PHYSICAL EXAM
[Alert] : alert [Normal Voice/Communication] : normal voice/communication [Healthy Appearing] : healthy appearing [No Acute Distress] : no acute distress [Well Nourished] : well nourished [Sclera] : the sclera and conjunctiva were normal [Hearing Threshold Finger Rub Not Harvey] : hearing was normal [Oropharynx] : the oropharynx was normal [Normal Appearance] : the appearance of the neck was normal [No Respiratory Distress] : no respiratory distress [No Acc Muscle Use] : no accessory muscle use [Respiration, Rhythm And Depth] : normal respiratory rhythm and effort [Heart Rate And Rhythm] : heart rate was normal and rhythm regular [None] : no edema [Bowel Sounds] : normal bowel sounds [Abdomen Tenderness] : non-tender [No Masses] : no abdominal mass palpated [Abdomen Soft] : soft [] : no hepatosplenomegaly [Cervical Lymph Nodes Enlarged Posterior Bilaterally] : no posterior cervical lymphadenopathy [Supraclavicular Lymph Nodes Enlarged Bilaterally] : no supraclavicular lymphadenopathy [No CVA Tenderness] : no CVA  tenderness [No Spinal Tenderness] : no spinal tenderness [Abnormal Walk] : normal gait [No Focal Deficits] : no focal deficits [Oriented To Time, Place, And Person] : oriented to person, place, and time [de-identified] : A chaperone was present in the examining room during all aspects of the physical examination, thin appearing pleasant female, no acute distress [de-identified] : Small external hemorrhoid at the 12 o'clock position, minimally tender, probable small thrombosed hemorrhoid, no visual anal fissure, limited digital exam because of discomfort, no blood or mucus

## 2023-08-14 NOTE — REASON FOR VISIT
[Follow-up] : a follow-up of an existing diagnosis [FreeTextEntry1] : Rectal bleeding and discomfort

## 2023-11-09 ENCOUNTER — APPOINTMENT (OUTPATIENT)
Dept: GASTROENTEROLOGY | Facility: CLINIC | Age: 33
End: 2023-11-09
Payer: COMMERCIAL

## 2023-11-09 VITALS
WEIGHT: 120 LBS | HEART RATE: 82 BPM | SYSTOLIC BLOOD PRESSURE: 110 MMHG | HEIGHT: 62 IN | TEMPERATURE: 96.8 F | BODY MASS INDEX: 22.08 KG/M2 | DIASTOLIC BLOOD PRESSURE: 72 MMHG | OXYGEN SATURATION: 98 %

## 2023-11-09 DIAGNOSIS — R10.13 EPIGASTRIC PAIN: ICD-10-CM

## 2023-11-09 DIAGNOSIS — R10.9 UNSPECIFIED ABDOMINAL PAIN: ICD-10-CM

## 2023-11-09 DIAGNOSIS — K62.5 HEMORRHAGE OF ANUS AND RECTUM: ICD-10-CM

## 2023-11-09 DIAGNOSIS — K64.5 PERIANAL VENOUS THROMBOSIS: ICD-10-CM

## 2023-11-09 PROCEDURE — 99214 OFFICE O/P EST MOD 30 MIN: CPT

## 2023-11-09 RX ORDER — HYDROCORTISONE 2.5% 25 MG/G
2.5 CREAM TOPICAL
Qty: 1 | Refills: 3 | Status: ACTIVE | COMMUNITY
Start: 2023-11-09 | End: 1900-01-01

## 2023-12-26 ENCOUNTER — APPOINTMENT (OUTPATIENT)
Dept: GASTROENTEROLOGY | Facility: AMBULATORY MEDICAL SERVICES | Age: 33
End: 2023-12-26
Payer: COMMERCIAL

## 2023-12-26 PROCEDURE — 43239 EGD BIOPSY SINGLE/MULTIPLE: CPT

## 2024-01-11 RX ORDER — DEXLANSOPRAZOLE 60 MG/1
60 CAPSULE, DELAYED RELEASE ORAL DAILY
Qty: 30 | Refills: 3 | Status: ACTIVE | COMMUNITY
Start: 2024-01-11 | End: 1900-01-01

## 2024-01-11 NOTE — ED PROVIDER NOTE - PATIENT PORTAL LINK FT
General Sunscreen Counseling: I recommended a broad spectrum sunscreen with a SPF of 30 or higher.  I explained that SPF 30 sunscreens block approximately 97 percent of the sun's harmful rays.  Sunscreens should be applied at least 15 minutes prior to expected sun exposure and then every 2 hours after that as long as sun exposure continues. If swimming or exercising sunscreen should be reapplied every 45 minutes to an hour after getting wet or sweating.  One ounce, or the equivalent of a shot glass full of sunscreen, is adequate to protect the skin not covered by a bathing suit. I also recommended a lip balm with a sunscreen as well. Sun protective clothing can be used in lieu of sunscreen but must be worn the entire time you are exposed to the sun's rays. Detail Level: Detailed You can access the FollowMyHealth Patient Portal offered by [facility] by registering at the following website: [email or web address]. By joining [employer]’s FollowMyHealth portal, you will also be able to view your health information using other applications (apps) compatible with our system.

## 2024-02-25 ENCOUNTER — EMERGENCY (EMERGENCY)
Facility: HOSPITAL | Age: 34
LOS: 1 days | Discharge: HOME | End: 2024-02-25
Attending: EMERGENCY MEDICINE | Admitting: EMERGENCY MEDICINE
Payer: COMMERCIAL

## 2024-02-25 VITALS
DIASTOLIC BLOOD PRESSURE: 79 MMHG | HEART RATE: 80 BPM | SYSTOLIC BLOOD PRESSURE: 114 MMHG | TEMPERATURE: 98 F | OXYGEN SATURATION: 100 % | RESPIRATION RATE: 18 BRPM

## 2024-02-25 VITALS
HEART RATE: 66 BPM | SYSTOLIC BLOOD PRESSURE: 101 MMHG | RESPIRATION RATE: 18 BRPM | DIASTOLIC BLOOD PRESSURE: 61 MMHG | OXYGEN SATURATION: 100 %

## 2024-02-25 LAB
ALBUMIN SERPL ELPH-MCNC: 4.2 G/DL — SIGNIFICANT CHANGE UP (ref 3.3–5)
ALP SERPL-CCNC: 48 U/L — SIGNIFICANT CHANGE UP (ref 40–120)
ALT FLD-CCNC: 16 U/L — SIGNIFICANT CHANGE UP (ref 4–33)
ANION GAP SERPL CALC-SCNC: 11 MMOL/L — SIGNIFICANT CHANGE UP (ref 7–14)
AST SERPL-CCNC: 16 U/L — SIGNIFICANT CHANGE UP (ref 4–32)
BASOPHILS # BLD AUTO: 0.01 K/UL — SIGNIFICANT CHANGE UP (ref 0–0.2)
BASOPHILS NFR BLD AUTO: 0.1 % — SIGNIFICANT CHANGE UP (ref 0–2)
BILIRUB SERPL-MCNC: 0.3 MG/DL — SIGNIFICANT CHANGE UP (ref 0.2–1.2)
BUN SERPL-MCNC: 8 MG/DL — SIGNIFICANT CHANGE UP (ref 7–23)
CALCIUM SERPL-MCNC: 9.1 MG/DL — SIGNIFICANT CHANGE UP (ref 8.4–10.5)
CHLORIDE SERPL-SCNC: 99 MMOL/L — SIGNIFICANT CHANGE UP (ref 98–107)
CO2 SERPL-SCNC: 28 MMOL/L — SIGNIFICANT CHANGE UP (ref 22–31)
CREAT SERPL-MCNC: 0.7 MG/DL — SIGNIFICANT CHANGE UP (ref 0.5–1.3)
D DIMER BLD IA.RAPID-MCNC: <150 NG/ML DDU — SIGNIFICANT CHANGE UP
EGFR: 119 ML/MIN/1.73M2 — SIGNIFICANT CHANGE UP
EOSINOPHIL # BLD AUTO: 0.14 K/UL — SIGNIFICANT CHANGE UP (ref 0–0.5)
EOSINOPHIL NFR BLD AUTO: 1.3 % — SIGNIFICANT CHANGE UP (ref 0–6)
GLUCOSE SERPL-MCNC: 92 MG/DL — SIGNIFICANT CHANGE UP (ref 70–99)
HCG SERPL-ACNC: <1 MIU/ML — SIGNIFICANT CHANGE UP
HCT VFR BLD CALC: 37.9 % — SIGNIFICANT CHANGE UP (ref 34.5–45)
HGB BLD-MCNC: 12.6 G/DL — SIGNIFICANT CHANGE UP (ref 11.5–15.5)
IANC: 7.99 K/UL — HIGH (ref 1.8–7.4)
IMM GRANULOCYTES NFR BLD AUTO: 0.4 % — SIGNIFICANT CHANGE UP (ref 0–0.9)
LYMPHOCYTES # BLD AUTO: 18.8 % — SIGNIFICANT CHANGE UP (ref 13–44)
LYMPHOCYTES # BLD AUTO: 2.05 K/UL — SIGNIFICANT CHANGE UP (ref 1–3.3)
MCHC RBC-ENTMCNC: 30.4 PG — SIGNIFICANT CHANGE UP (ref 27–34)
MCHC RBC-ENTMCNC: 33.2 GM/DL — SIGNIFICANT CHANGE UP (ref 32–36)
MCV RBC AUTO: 91.3 FL — SIGNIFICANT CHANGE UP (ref 80–100)
MONOCYTES # BLD AUTO: 0.7 K/UL — SIGNIFICANT CHANGE UP (ref 0–0.9)
MONOCYTES NFR BLD AUTO: 6.4 % — SIGNIFICANT CHANGE UP (ref 2–14)
NEUTROPHILS # BLD AUTO: 7.99 K/UL — HIGH (ref 1.8–7.4)
NEUTROPHILS NFR BLD AUTO: 73 % — SIGNIFICANT CHANGE UP (ref 43–77)
NRBC # BLD: 0 /100 WBCS — SIGNIFICANT CHANGE UP (ref 0–0)
NRBC # FLD: 0 K/UL — SIGNIFICANT CHANGE UP (ref 0–0)
PLATELET # BLD AUTO: 274 K/UL — SIGNIFICANT CHANGE UP (ref 150–400)
POTASSIUM SERPL-MCNC: 4 MMOL/L — SIGNIFICANT CHANGE UP (ref 3.5–5.3)
POTASSIUM SERPL-SCNC: 4 MMOL/L — SIGNIFICANT CHANGE UP (ref 3.5–5.3)
PROT SERPL-MCNC: 6.7 G/DL — SIGNIFICANT CHANGE UP (ref 6–8.3)
RBC # BLD: 4.15 M/UL — SIGNIFICANT CHANGE UP (ref 3.8–5.2)
RBC # FLD: 12.1 % — SIGNIFICANT CHANGE UP (ref 10.3–14.5)
SODIUM SERPL-SCNC: 138 MMOL/L — SIGNIFICANT CHANGE UP (ref 135–145)
TROPONIN T, HIGH SENSITIVITY RESULT: <6 NG/L — SIGNIFICANT CHANGE UP
WBC # BLD: 10.93 K/UL — HIGH (ref 3.8–10.5)
WBC # FLD AUTO: 10.93 K/UL — HIGH (ref 3.8–10.5)

## 2024-02-25 PROCEDURE — 72100 X-RAY EXAM L-S SPINE 2/3 VWS: CPT | Mod: 26

## 2024-02-25 PROCEDURE — 99053 MED SERV 10PM-8AM 24 HR FAC: CPT

## 2024-02-25 PROCEDURE — 72020 X-RAY EXAM OF SPINE 1 VIEW: CPT | Mod: 26

## 2024-02-25 PROCEDURE — 93010 ELECTROCARDIOGRAM REPORT: CPT

## 2024-02-25 PROCEDURE — 71046 X-RAY EXAM CHEST 2 VIEWS: CPT | Mod: 26

## 2024-02-25 PROCEDURE — 73030 X-RAY EXAM OF SHOULDER: CPT | Mod: 26,LT

## 2024-02-25 PROCEDURE — 99284 EMERGENCY DEPT VISIT MOD MDM: CPT

## 2024-02-25 RX ORDER — ACETAMINOPHEN 500 MG
975 TABLET ORAL ONCE
Refills: 0 | Status: COMPLETED | OUTPATIENT
Start: 2024-02-25 | End: 2024-02-25

## 2024-02-25 RX ORDER — CYCLOBENZAPRINE HYDROCHLORIDE 10 MG/1
1 TABLET, FILM COATED ORAL
Qty: 9 | Refills: 0
Start: 2024-02-25 | End: 2024-02-27

## 2024-02-25 RX ADMIN — Medication 975 MILLIGRAM(S): at 07:48

## 2024-02-25 NOTE — ED PROVIDER NOTE - WR ORDER DATE AND TIME 1
[EKG obtained to assist in diagnosis and management of assessed problem(s)] : EKG obtained to assist in diagnosis and management of assessed problem(s) 25-Feb-2024 07:41

## 2024-02-25 NOTE — ED ADULT NURSE REASSESSMENT NOTE - NS ED NURSE REASSESS COMMENT FT1
Pt is lying comfortably in stretcher, family at bedside. Pt is A&Ox4, not in acute distress. 20G IV placed in right AC by RN Taylor, no redness or swelling observed at site. labs drawn and sent. Medicated as per MD orders. Bed in lowest position, safety maintained.

## 2024-02-25 NOTE — ED PROVIDER NOTE - PATIENT PORTAL LINK FT
You can access the FollowMyHealth Patient Portal offered by [facility] by registering at the following website: [email or web address]. By joining [employer]’s FollowMyHealth portal, you will also be able to view your health information using other applications (apps) compatible with our system.

## 2024-02-25 NOTE — ED PROVIDER NOTE - NSFOLLOWUPINSTRUCTIONS_ED_ALL_ED_FT
You were seen in the emergency department for pain to your back shoulder and chest area.  You had blood work, an EKG, and x-rays performed.  You were given medication in the emergency department for your pain.  The findings of all your results are included in this packet.  Please take ibuprofen 400mg every 6 hours as needed for pain with food.  Please take tylenol 650mg every 4 hours as needed for pain.    Please follow-up in the next few days with your regular doctor for reassessment and bring copies of your results with you.  If your pain continues you may also follow-up with an orthopedist. For further evaluation.  Please see attached instructions on things you can do at home to help with your pain.    Strain    A strain is a stretch or tear in one of the muscles in your body. This is caused by an injury to the area such as a twisting mechanism. Symptoms include pain, swelling, or bruising. Rest that area over the next several days and slowly resume activity when tolerated. Ice can help with swelling and pain.     SEEK IMMEDIATE MEDICAL CARE IF YOU HAVE ANY OF THE FOLLOWING SYMPTOMS: worsening pain, inability to move that body part, numbness or tingling, difficulty breathing, vomiting.    Contusion    A contusion is a deep bruise. Contusions are the result of a blunt injury to tissues and muscle fibers under the skin. The skin overlying the contusion may turn blue, purple, or yellow. Symptoms also include pain and swelling in the injured area.    SEEK IMMEDIATE MEDICAL CARE IF YOU HAVE ANY OF THE FOLLOWING SYMPTOMS: severe pain, numbness, tingling, pain, weakness, or skin color/temperature change in any part of your body distal to the injury.
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
## 2024-02-25 NOTE — ED PROVIDER NOTE - PROGRESS NOTE DETAILS
Results with no actionable findings. All results d/w patient and copies given. Instructed on pain control and return precautions as well as md f/u for reeval.
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
## 2024-02-25 NOTE — ED PROVIDER NOTE - PHYSICAL EXAMINATION
GEN - NAD; well appearing; A+O x3   HEAD - NC/AT   EYES- PERRL, EOMI  ENT: Airway patent, mmm, Oral cavity and pharynx normal. No inflammation, swelling, exudate, or lesions.    NECK: Neck supple  PULMONARY - CTA b/l, symmetric breath sounds.   CARDIAC -s1s2, RRR, no M,G,R, no chest wall ttp, no crepitus, skin normal  ABDOMEN - +BS, ND, NT, soft, no guarding, no rebound, no masses   BACK - +ttp to paraspinal musculature of left upper back/trapezius region along with thoracic and lumbar spine. No deformity/stepoff, no bruising  EXTREMITIES - FROM to lue without limitation, NVI, silt, symmetric pulses, capillary refill < 2 seconds, no edema   SKIN - no rash or bruising   NEUROLOGIC - alert, speech clear, no focal deficits  PSYCH -nl mood/affect, nl insight.
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
## 2025-05-23 ENCOUNTER — EMERGENCY (EMERGENCY)
Facility: HOSPITAL | Age: 35
LOS: 1 days | End: 2025-05-23
Attending: EMERGENCY MEDICINE | Admitting: EMERGENCY MEDICINE
Payer: COMMERCIAL

## 2025-05-23 VITALS
OXYGEN SATURATION: 98 % | TEMPERATURE: 98 F | DIASTOLIC BLOOD PRESSURE: 68 MMHG | HEART RATE: 88 BPM | HEIGHT: 62 IN | WEIGHT: 119.93 LBS | RESPIRATION RATE: 15 BRPM | SYSTOLIC BLOOD PRESSURE: 101 MMHG

## 2025-05-23 PROCEDURE — 12001 RPR S/N/AX/GEN/TRNK 2.5CM/<: CPT

## 2025-05-23 PROCEDURE — 99284 EMERGENCY DEPT VISIT MOD MDM: CPT | Mod: 25

## 2025-05-23 RX ORDER — LIDOCAINE HCL/PF 10 MG/ML
10 VIAL (ML) INJECTION ONCE
Refills: 0 | Status: COMPLETED | OUTPATIENT
Start: 2025-05-23 | End: 2025-05-23

## 2025-05-23 RX ADMIN — Medication 10 MILLILITER(S): at 11:35

## 2025-05-23 NOTE — ED PROVIDER NOTE - ATTENDING CONTRIBUTION TO CARE
34 year old woman no PMH presenting with a laceration to her left hand. she was cutting an avocado when her hand slipped and slicked the base of her lateral second left finger. Pain is well controlled, last tetanus in february. denies numbness, tingling, weakness.

## 2025-05-23 NOTE — ED PROVIDER NOTE - CARE PLAN
1 Principal Discharge DX:	Finger pain, left  Secondary Diagnosis:	Laceration of finger of left hand without damage to nail

## 2025-05-23 NOTE — ED PROVIDER NOTE - PROGRESS NOTE DETAILS
Annmarie PUENTES PGY1: patient s/p laceration repair, 3 absorbable sutures placed. feeling well, discussed return precautions, follow up.

## 2025-05-23 NOTE — ED ADULT NURSE NOTE - CHIEF COMPLAINT QUOTE
coming from urgent care c/o laceration to the L hand. pt arrives with gauge in place with bleeding controlled. denies use of blood thinners. reports cut hand this morning while slicing an avocado. reports last had tetanus shot in February 2025. denies Phx.
08-Jan-2019 20:18

## 2025-05-23 NOTE — ED PROVIDER NOTE - PATIENT PORTAL LINK FT
You can access the FollowMyHealth Patient Portal offered by [facility] by registering at the following website: [email or web address]. By joining [employer]’s FollowMyHealth portal, you will also be able to view your health information using other applications (apps) compatible with our system.

## 2025-05-23 NOTE — ED PROVIDER NOTE - PHYSICAL EXAMINATION
Physical Exam:  Gen: no acute distress, AOx3, nontoxic appearing  Head: NCAT  HEENT: EOMI, PEERLA, normal conjunctiva, tongue midline, oral mucosa moist  Lung: CTAB, no respiratory distress, no wheezes/rhonchi/rales B/L, speaking in full sentences  CV: RRR, no murmurs, rubs or gallops  Abd:(soft, NT, ND, no guarding, no rigidity, no rebound tenderness, no CVA tenderness  MSK: no visible deformities, ROM normal in UE/LE, no neck / back pain, calf tenderness, full ROM of fingers in left hand, cap refill <2 seconds in left fingers.   Neuro: No focal sensory or motor deficits in cranial nerves, upper and lower extremities  Skin: Warm, well perfused, no rash, no leg swelling, 2cm laceration to space between second finger and thumb of left hand.

## 2025-05-23 NOTE — ED ADULT TRIAGE NOTE - CHIEF COMPLAINT QUOTE
coming from urgent care c/o laceration to the L hand. pt arrives with gauge in place with bleeding controlled. denies use of blood thinners. reports cut hand this morning while slicing an avocado. reports last had tetanus shot in February 2025. denies Phx.

## 2025-05-23 NOTE — ED ADULT TRIAGE NOTE - BSA (M2)
1.  Come back tomorrow for xray of arm and shoulder, I will call with results  2.  Anticipate ortho follow up for arm  3.  For ingrown nail try warm soaks twice a day and try inserting dental floss between nail and skin.  If no improvement in redness/pain or worsening start keflex antibiotic 4 times a day for 2 weeks.  See podiatry for partial nail removal  4.  See surgery for removal of pilonidal cyst, try warm soaks and apply gentle pressure to continue to encourage drainage     
1.54

## 2025-05-23 NOTE — ED PROVIDER NOTE - CLINICAL SUMMARY MEDICAL DECISION MAKING FREE TEXT BOX
34 year old woman no PMH presenting with a laceration to her left hand, finger is neurovascularly intact, very low suspicion for fracture or tendinous / ligamentous injuryt given benign physical exam. laceration will require stitches, will irrigate the finger and perform laceration repair.

## 2025-05-23 NOTE — ED PROVIDER NOTE - NSFOLLOWUPINSTRUCTIONS_ED_ALL_ED_FT
You were seen in the Emergency Department for a laceration. You were given 3 absorbable stitches. Please keep the area clean and dry. Do not scrub the area in the shower and do not pick at the stitches.  For pain, you can take tylenol 1000mg every 6 hours as needed, no more than 4000mg in one day. Follow up with your primary care doctor.     Return to the Emergency Department if you experience worsening pain, redness, or swelling of the wound site, have pus draining from the wound, develop fevers or chills, or have an inability to move your finger.

## 2025-05-23 NOTE — ED ADULT NURSE NOTE - OBJECTIVE STATEMENT
Pt arrives ambulatory on room air c/o laceration to left hand. Bleeding controlled. Pt with no acute distress. Pt respirations even and unlabored, chest rise and fall equal b/l. Pt denies chest pain, HA, SOB, dizziness, N/V/D, fever/chills. Stretcher in lowest position, pt safety maintained.